# Patient Record
Sex: MALE | Race: WHITE | NOT HISPANIC OR LATINO | URBAN - METROPOLITAN AREA
[De-identification: names, ages, dates, MRNs, and addresses within clinical notes are randomized per-mention and may not be internally consistent; named-entity substitution may affect disease eponyms.]

---

## 2017-03-02 ENCOUNTER — OUTPATIENT (OUTPATIENT)
Dept: OUTPATIENT SERVICES | Facility: HOSPITAL | Age: 82
LOS: 1 days | End: 2017-03-02
Payer: MEDICARE

## 2017-03-02 DIAGNOSIS — I82.409 ACUTE EMBOLISM AND THROMBOSIS OF UNSPECIFIED DEEP VEINS OF UNSPECIFIED LOWER EXTREMITY: ICD-10-CM

## 2017-03-02 PROCEDURE — 93971 EXTREMITY STUDY: CPT | Mod: 26

## 2017-03-02 PROCEDURE — 93971 EXTREMITY STUDY: CPT

## 2018-02-10 ENCOUNTER — INPATIENT (INPATIENT)
Facility: HOSPITAL | Age: 83
LOS: 1 days | Discharge: NURSING, REHAB OR LONG-TERM CARE | DRG: 312 | End: 2018-02-12
Attending: INTERNAL MEDICINE | Admitting: INTERNAL MEDICINE
Payer: MEDICARE

## 2018-02-10 VITALS
SYSTOLIC BLOOD PRESSURE: 156 MMHG | TEMPERATURE: 98 F | HEIGHT: 69 IN | OXYGEN SATURATION: 96 % | WEIGHT: 110.01 LBS | RESPIRATION RATE: 18 BRPM | HEART RATE: 80 BPM | DIASTOLIC BLOOD PRESSURE: 54 MMHG

## 2018-02-10 DIAGNOSIS — R55 SYNCOPE AND COLLAPSE: ICD-10-CM

## 2018-02-10 DIAGNOSIS — Z95.1 PRESENCE OF AORTOCORONARY BYPASS GRAFT: Chronic | ICD-10-CM

## 2018-02-10 DIAGNOSIS — I10 ESSENTIAL (PRIMARY) HYPERTENSION: ICD-10-CM

## 2018-02-10 DIAGNOSIS — R41.82 ALTERED MENTAL STATUS, UNSPECIFIED: ICD-10-CM

## 2018-02-10 DIAGNOSIS — Z29.9 ENCOUNTER FOR PROPHYLACTIC MEASURES, UNSPECIFIED: ICD-10-CM

## 2018-02-10 DIAGNOSIS — I25.10 ATHEROSCLEROTIC HEART DISEASE OF NATIVE CORONARY ARTERY WITHOUT ANGINA PECTORIS: ICD-10-CM

## 2018-02-10 DIAGNOSIS — R29.6 REPEATED FALLS: ICD-10-CM

## 2018-02-10 DIAGNOSIS — E03.9 HYPOTHYROIDISM, UNSPECIFIED: ICD-10-CM

## 2018-02-10 DIAGNOSIS — F03.91 UNSPECIFIED DEMENTIA WITH BEHAVIORAL DISTURBANCE: ICD-10-CM

## 2018-02-10 LAB
ALBUMIN SERPL ELPH-MCNC: 3.6 G/DL — SIGNIFICANT CHANGE UP (ref 3.3–5)
ALP SERPL-CCNC: 225 U/L — HIGH (ref 30–120)
ALT FLD-CCNC: 15 U/L DA — SIGNIFICANT CHANGE UP (ref 10–60)
ANION GAP SERPL CALC-SCNC: 11 MMOL/L — SIGNIFICANT CHANGE UP (ref 5–17)
APPEARANCE UR: CLEAR — SIGNIFICANT CHANGE UP
APTT BLD: 30.5 SEC — SIGNIFICANT CHANGE UP (ref 27.5–37.4)
AST SERPL-CCNC: 20 U/L — SIGNIFICANT CHANGE UP (ref 10–40)
BASOPHILS # BLD AUTO: 0.1 K/UL — SIGNIFICANT CHANGE UP (ref 0–0.2)
BASOPHILS NFR BLD AUTO: 0.9 % — SIGNIFICANT CHANGE UP (ref 0–2)
BILIRUB SERPL-MCNC: 0.4 MG/DL — SIGNIFICANT CHANGE UP (ref 0.2–1.2)
BILIRUB UR-MCNC: NEGATIVE — SIGNIFICANT CHANGE UP
BUN SERPL-MCNC: 29 MG/DL — HIGH (ref 7–23)
CALCIUM SERPL-MCNC: 8.4 MG/DL — SIGNIFICANT CHANGE UP (ref 8.4–10.5)
CHLORIDE SERPL-SCNC: 105 MMOL/L — SIGNIFICANT CHANGE UP (ref 96–108)
CK MB BLD-MCNC: 1.2 % — SIGNIFICANT CHANGE UP (ref 0–3.5)
CK MB CFR SERPL CALC: 1.4 NG/ML — SIGNIFICANT CHANGE UP (ref 0–3.6)
CK SERPL-CCNC: 118 U/L — SIGNIFICANT CHANGE UP (ref 39–308)
CO2 SERPL-SCNC: 25 MMOL/L — SIGNIFICANT CHANGE UP (ref 22–31)
COLOR SPEC: YELLOW — SIGNIFICANT CHANGE UP
CREAT SERPL-MCNC: 1.73 MG/DL — HIGH (ref 0.5–1.3)
DIFF PNL FLD: NEGATIVE — SIGNIFICANT CHANGE UP
EOSINOPHIL # BLD AUTO: 0.2 K/UL — SIGNIFICANT CHANGE UP (ref 0–0.5)
EOSINOPHIL NFR BLD AUTO: 2 % — SIGNIFICANT CHANGE UP (ref 0–6)
GLUCOSE BLDC GLUCOMTR-MCNC: 120 MG/DL — HIGH (ref 70–99)
GLUCOSE SERPL-MCNC: 146 MG/DL — HIGH (ref 70–99)
GLUCOSE UR QL: NEGATIVE MG/DL — SIGNIFICANT CHANGE UP
HCT VFR BLD CALC: 35.9 % — LOW (ref 39–50)
HGB BLD-MCNC: 12.5 G/DL — LOW (ref 13–17)
INR BLD: 1 RATIO — SIGNIFICANT CHANGE UP (ref 0.88–1.16)
KETONES UR-MCNC: NEGATIVE — SIGNIFICANT CHANGE UP
LEUKOCYTE ESTERASE UR-ACNC: NEGATIVE — SIGNIFICANT CHANGE UP
LYMPHOCYTES # BLD AUTO: 1 K/UL — SIGNIFICANT CHANGE UP (ref 1–3.3)
LYMPHOCYTES # BLD AUTO: 12.4 % — LOW (ref 13–44)
MCHC RBC-ENTMCNC: 34.1 PG — HIGH (ref 27–34)
MCHC RBC-ENTMCNC: 34.7 GM/DL — SIGNIFICANT CHANGE UP (ref 32–36)
MCV RBC AUTO: 98.1 FL — SIGNIFICANT CHANGE UP (ref 80–100)
MONOCYTES # BLD AUTO: 0.7 K/UL — SIGNIFICANT CHANGE UP (ref 0–0.9)
MONOCYTES NFR BLD AUTO: 8.4 % — SIGNIFICANT CHANGE UP (ref 2–14)
NEUTROPHILS # BLD AUTO: 5.9 K/UL — SIGNIFICANT CHANGE UP (ref 1.8–7.4)
NEUTROPHILS NFR BLD AUTO: 76.3 % — SIGNIFICANT CHANGE UP (ref 43–77)
NITRITE UR-MCNC: NEGATIVE — SIGNIFICANT CHANGE UP
PH UR: 7 — SIGNIFICANT CHANGE UP (ref 5–8)
PLATELET # BLD AUTO: 214 K/UL — SIGNIFICANT CHANGE UP (ref 150–400)
POTASSIUM SERPL-MCNC: 4.3 MMOL/L — SIGNIFICANT CHANGE UP (ref 3.5–5.3)
POTASSIUM SERPL-SCNC: 4.3 MMOL/L — SIGNIFICANT CHANGE UP (ref 3.5–5.3)
PROT SERPL-MCNC: 7.3 G/DL — SIGNIFICANT CHANGE UP (ref 6–8.3)
PROT UR-MCNC: 30 MG/DL
PROTHROM AB SERPL-ACNC: 10.9 SEC — SIGNIFICANT CHANGE UP (ref 9.8–12.7)
RBC # BLD: 3.66 M/UL — LOW (ref 4.2–5.8)
RBC # FLD: 12.6 % — SIGNIFICANT CHANGE UP (ref 10.3–14.5)
SODIUM SERPL-SCNC: 141 MMOL/L — SIGNIFICANT CHANGE UP (ref 135–145)
SP GR SPEC: 1 — LOW (ref 1.01–1.02)
TROPONIN I SERPL-MCNC: 0.01 NG/ML — LOW (ref 0.02–0.06)
UROBILINOGEN FLD QL: NEGATIVE MG/DL — SIGNIFICANT CHANGE UP
WBC # BLD: 7.7 K/UL — SIGNIFICANT CHANGE UP (ref 3.8–10.5)
WBC # FLD AUTO: 7.7 K/UL — SIGNIFICANT CHANGE UP (ref 3.8–10.5)

## 2018-02-10 PROCEDURE — 71045 X-RAY EXAM CHEST 1 VIEW: CPT | Mod: 26

## 2018-02-10 PROCEDURE — 93010 ELECTROCARDIOGRAM REPORT: CPT

## 2018-02-10 PROCEDURE — 99285 EMERGENCY DEPT VISIT HI MDM: CPT

## 2018-02-10 PROCEDURE — 70450 CT HEAD/BRAIN W/O DYE: CPT | Mod: 26

## 2018-02-10 PROCEDURE — 72125 CT NECK SPINE W/O DYE: CPT | Mod: 26

## 2018-02-10 RX ORDER — ROPINIROLE 8 MG/1
0.5 TABLET, FILM COATED, EXTENDED RELEASE ORAL AT BEDTIME
Qty: 0 | Refills: 0 | Status: DISCONTINUED | OUTPATIENT
Start: 2018-02-10 | End: 2018-02-12

## 2018-02-10 RX ORDER — HYDRALAZINE HCL 50 MG
50 TABLET ORAL THREE TIMES A DAY
Qty: 0 | Refills: 0 | Status: DISCONTINUED | OUTPATIENT
Start: 2018-02-10 | End: 2018-02-12

## 2018-02-10 RX ORDER — HEPARIN SODIUM 5000 [USP'U]/ML
5000 INJECTION INTRAVENOUS; SUBCUTANEOUS EVERY 12 HOURS
Qty: 0 | Refills: 0 | Status: DISCONTINUED | OUTPATIENT
Start: 2018-02-10 | End: 2018-02-12

## 2018-02-10 RX ORDER — SODIUM CHLORIDE 9 MG/ML
3 INJECTION INTRAMUSCULAR; INTRAVENOUS; SUBCUTANEOUS ONCE
Qty: 0 | Refills: 0 | Status: COMPLETED | OUTPATIENT
Start: 2018-02-10 | End: 2018-02-10

## 2018-02-10 RX ORDER — DOCUSATE SODIUM 100 MG
100 CAPSULE ORAL DAILY
Qty: 0 | Refills: 0 | Status: DISCONTINUED | OUTPATIENT
Start: 2018-02-10 | End: 2018-02-12

## 2018-02-10 RX ORDER — SODIUM CHLORIDE 9 MG/ML
1000 INJECTION INTRAMUSCULAR; INTRAVENOUS; SUBCUTANEOUS
Qty: 0 | Refills: 0 | Status: DISCONTINUED | OUTPATIENT
Start: 2018-02-10 | End: 2018-02-12

## 2018-02-10 RX ORDER — PANTOPRAZOLE SODIUM 20 MG/1
40 TABLET, DELAYED RELEASE ORAL
Qty: 0 | Refills: 0 | Status: DISCONTINUED | OUTPATIENT
Start: 2018-02-10 | End: 2018-02-12

## 2018-02-10 RX ORDER — SODIUM CHLORIDE 9 MG/ML
1000 INJECTION INTRAMUSCULAR; INTRAVENOUS; SUBCUTANEOUS ONCE
Qty: 0 | Refills: 0 | Status: COMPLETED | OUTPATIENT
Start: 2018-02-10 | End: 2018-02-10

## 2018-02-10 RX ORDER — PREGABALIN 225 MG/1
500 CAPSULE ORAL DAILY
Qty: 0 | Refills: 0 | Status: DISCONTINUED | OUTPATIENT
Start: 2018-02-10 | End: 2018-02-12

## 2018-02-10 RX ORDER — ACETAMINOPHEN 500 MG
650 TABLET ORAL EVERY 6 HOURS
Qty: 0 | Refills: 0 | Status: DISCONTINUED | OUTPATIENT
Start: 2018-02-10 | End: 2018-02-12

## 2018-02-10 RX ORDER — LEVOTHYROXINE SODIUM 125 MCG
25 TABLET ORAL DAILY
Qty: 0 | Refills: 0 | Status: DISCONTINUED | OUTPATIENT
Start: 2018-02-10 | End: 2018-02-12

## 2018-02-10 RX ORDER — SIMVASTATIN 20 MG/1
40 TABLET, FILM COATED ORAL AT BEDTIME
Qty: 0 | Refills: 0 | Status: DISCONTINUED | OUTPATIENT
Start: 2018-02-10 | End: 2018-02-12

## 2018-02-10 RX ORDER — ASPIRIN/CALCIUM CARB/MAGNESIUM 324 MG
81 TABLET ORAL DAILY
Qty: 0 | Refills: 0 | Status: DISCONTINUED | OUTPATIENT
Start: 2018-02-10 | End: 2018-02-12

## 2018-02-10 RX ADMIN — SODIUM CHLORIDE 1000 MILLILITER(S): 9 INJECTION INTRAMUSCULAR; INTRAVENOUS; SUBCUTANEOUS at 20:52

## 2018-02-10 RX ADMIN — SODIUM CHLORIDE 3 MILLILITER(S): 9 INJECTION INTRAMUSCULAR; INTRAVENOUS; SUBCUTANEOUS at 19:43

## 2018-02-10 NOTE — H&P ADULT - NEUROLOGICAL DETAILS
sensation intact/deep reflexes intact/responds to pain/responds to verbal commands/alert and oriented x 3/cranial nerves intact

## 2018-02-10 NOTE — ED PROVIDER NOTE - OBJECTIVE STATEMENT
89 y.o. M BIBEMS for AMS - pt reportedly found unresponsive, possibly on floor, no further information available at this time, pt opening eyes spontaneously, not speaking, not following commands. Is DNR/DNI per [facility]. Do not currently have access to further information, awaiting contact with assisted living to fax face sheet, next of kin information and more information on current situation.

## 2018-02-10 NOTE — ED ADULT NURSE REASSESSMENT NOTE - NS ED NURSE REASSESS COMMENT FT1
Pt alert, answering all questions. Aware of who he is and where he is. States does not remember what happened to him. Interacting pleasantly w/ staff and granddtr. Voided 300cc clear, yellow urine. Used urinal himself w/out assistance. U/A, Urine c/s sent to lab.

## 2018-02-10 NOTE — ED PROVIDER NOTE - MEDICAL DECISION MAKING DETAILS
89 y.o. M DENISE from nursing home for AMS, pt's eyes open, otherwise non-responsive, iv, labs, imaging, reassess

## 2018-02-10 NOTE — ED ADULT NURSE REASSESSMENT NOTE - NS ED NURSE REASSESS COMMENT FT1
Rec'd pt unresponsive to verbal, tactile and painful stimuli. CM NSR 70's, no ectopi. V/SS, afebrile. Pupils unequal but responding to light. Neck brace applied. Spoke to Michell and [facility]. States fell and hit head. Does not know if witnessed. Called EMS immediately for ER transport. Pt's granddtr at ER bedside. 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 Pt currently in CT for STAT CT brain, c-spine. Rec'd pt unresponsive to verbal, tactile and painful stimuli. CM NSR 70's, no ectopi. V/SS, afebrile. Pupils unequal but responding to light. Neck brace applied. Spoke to Michell and Jadyn Court. States fell and hit head. Does not know if witnessed. Called EMS immediately for ER transport. Pt's granddtr at ER bedside. 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 Pt currently in CT for STAT CT brain, c-spine.    **ADDENDUM: As per Michell from "[employer] Court, pt is normal ambulatory and alert and verbal. After fall, change in mental status.

## 2018-02-10 NOTE — H&P ADULT - HISTORY OF PRESENT ILLNESS
89 yr old with pmh of CAD,cva,mitral regurg,renal insufficiency,prostate ca, hypothyroidis,dementia,peptic ulcer disease, GI bleed, insomnia, panic atatcks,cabg 10 yrs back.  no other complaint,hip surg,  hospitalized for cardiac problems. multiple times in past  allergic to ACEI, seafood,   denies any headache, no visual symptoms , no fever no bowel or urinary complaint  being admitted for possible syncope collapse, r/o neurocardiogenic causes, with FRANCO ckd, with hypothyroidism , and dementia

## 2018-02-10 NOTE — H&P ADULT - PMH
Coronary artery disease involving native coronary artery of native heart without angina pectoris    Dementia    High cholesterol    HTN (hypertension)    Hypothyroidism

## 2018-02-10 NOTE — H&P ADULT - ATTENDING COMMENTS
90minutes spent on this visit, 50% visit time spent in care co-ordination with other attendings and counselling patient  I have discussed care plan with patient and HCP,expressed understanding of problems treatment and their effect and side effects, alternatives in detail,I have asked if they have any questions and concerns and appropriately addressed them to best of my ability  Reviewed all diagonostic tests, lab results and drug drug interactions, and medications  d/w grand daughter at [phone] and on conference call with daughter rebeca,   pt is DNR dni

## 2018-02-10 NOTE — ED PROVIDER NOTE - CARE PLAN
Principal Discharge DX:	Altered mental status, unspecified altered mental status type Principal Discharge DX:	Syncope and collapse  Secondary Diagnosis:	Altered mental status, unspecified altered mental status type

## 2018-02-10 NOTE — H&P ADULT - ASSESSMENT
89 yr old with pmh of CAD,cva,mitral regurg,renal insufficiency,prostate ca, hypothyroidis,dementia,peptic ulcer disease, GI bleed, insomnia, panic atatcks,cabg 10 yrs back.  no other complaint,hip surg,  hospitalized for cardiac problems. multiple times in past  allergic to ACEI, seafood,   denies any headache, no visual symptoms , no fever no bowel or urinary complaint  being admitted for possible syncope collapse, r/o neurocardiogenic causes, with FRANCO ckd, with hypothyroidism , and dementia,CAD

## 2018-02-10 NOTE — ED ADULT NURSE REASSESSMENT NOTE - NS ED NURSE REASSESS COMMENT FT1
Left pupil 2mm and reactive to light.  Right pupil 3mm and reactive to light.  Pt's eyes open but remains unresponsive to verbal and painful stimuli. No movement to extremities. C-collar remains in place.

## 2018-02-11 LAB
ANION GAP SERPL CALC-SCNC: 8 MMOL/L — SIGNIFICANT CHANGE UP (ref 5–17)
BUN SERPL-MCNC: 24 MG/DL — HIGH (ref 7–23)
CALCIUM SERPL-MCNC: 8.8 MG/DL — SIGNIFICANT CHANGE UP (ref 8.4–10.5)
CHLORIDE SERPL-SCNC: 109 MMOL/L — HIGH (ref 96–108)
CHOLEST SERPL-MCNC: 123 MG/DL — SIGNIFICANT CHANGE UP (ref 10–199)
CK MB CFR SERPL CALC: 1.1 NG/ML — SIGNIFICANT CHANGE UP (ref 0–3.6)
CK MB CFR SERPL CALC: 1.7 NG/ML — SIGNIFICANT CHANGE UP (ref 0–3.6)
CO2 SERPL-SCNC: 27 MMOL/L — SIGNIFICANT CHANGE UP (ref 22–31)
CREAT SERPL-MCNC: 1.53 MG/DL — HIGH (ref 0.5–1.3)
GLUCOSE SERPL-MCNC: 104 MG/DL — HIGH (ref 70–99)
HCT VFR BLD CALC: 34.4 % — LOW (ref 39–50)
HDLC SERPL-MCNC: 46 MG/DL — SIGNIFICANT CHANGE UP (ref 40–125)
HGB BLD-MCNC: 12.2 G/DL — LOW (ref 13–17)
LIPID PNL WITH DIRECT LDL SERPL: 49 MG/DL — SIGNIFICANT CHANGE UP
MCHC RBC-ENTMCNC: 34.5 PG — HIGH (ref 27–34)
MCHC RBC-ENTMCNC: 35.3 GM/DL — SIGNIFICANT CHANGE UP (ref 32–36)
MCV RBC AUTO: 97.8 FL — SIGNIFICANT CHANGE UP (ref 80–100)
PLATELET # BLD AUTO: 194 K/UL — SIGNIFICANT CHANGE UP (ref 150–400)
POTASSIUM SERPL-MCNC: 4.4 MMOL/L — SIGNIFICANT CHANGE UP (ref 3.5–5.3)
POTASSIUM SERPL-SCNC: 4.4 MMOL/L — SIGNIFICANT CHANGE UP (ref 3.5–5.3)
RBC # BLD: 3.52 M/UL — LOW (ref 4.2–5.8)
RBC # FLD: 13 % — SIGNIFICANT CHANGE UP (ref 10.3–14.5)
SODIUM SERPL-SCNC: 144 MMOL/L — SIGNIFICANT CHANGE UP (ref 135–145)
T3 SERPL-MCNC: 73 NG/DL — LOW (ref 80–200)
T4 AB SER-ACNC: 6.2 UG/DL — SIGNIFICANT CHANGE UP (ref 4.6–12)
TOTAL CHOLESTEROL/HDL RATIO MEASUREMENT: 2.7 RATIO — LOW (ref 3.4–9.6)
TRIGL SERPL-MCNC: 140 MG/DL — SIGNIFICANT CHANGE UP (ref 10–149)
TROPONIN I SERPL-MCNC: 0.02 NG/ML — SIGNIFICANT CHANGE UP (ref 0.02–0.06)
TROPONIN I SERPL-MCNC: 0.02 NG/ML — SIGNIFICANT CHANGE UP (ref 0.02–0.06)
TSH SERPL-MCNC: 2.28 UIU/ML — SIGNIFICANT CHANGE UP (ref 0.27–4.2)
WBC # BLD: 7.8 K/UL — SIGNIFICANT CHANGE UP (ref 3.8–10.5)
WBC # FLD AUTO: 7.8 K/UL — SIGNIFICANT CHANGE UP (ref 3.8–10.5)

## 2018-02-11 PROCEDURE — 93880 EXTRACRANIAL BILAT STUDY: CPT | Mod: 26

## 2018-02-11 PROCEDURE — 93306 TTE W/DOPPLER COMPLETE: CPT | Mod: 26

## 2018-02-11 RX ADMIN — PANTOPRAZOLE SODIUM 40 MILLIGRAM(S): 20 TABLET, DELAYED RELEASE ORAL at 05:12

## 2018-02-11 RX ADMIN — HEPARIN SODIUM 5000 UNIT(S): 5000 INJECTION INTRAVENOUS; SUBCUTANEOUS at 05:07

## 2018-02-11 RX ADMIN — Medication 81 MILLIGRAM(S): at 12:13

## 2018-02-11 RX ADMIN — PREGABALIN 500 MICROGRAM(S): 225 CAPSULE ORAL at 12:12

## 2018-02-11 RX ADMIN — HEPARIN SODIUM 5000 UNIT(S): 5000 INJECTION INTRAVENOUS; SUBCUTANEOUS at 18:14

## 2018-02-11 RX ADMIN — Medication 100 MILLIGRAM(S): at 12:12

## 2018-02-11 RX ADMIN — Medication 25 MICROGRAM(S): at 05:09

## 2018-02-11 RX ADMIN — Medication 50 MILLIGRAM(S): at 14:08

## 2018-02-11 RX ADMIN — Medication 50 MILLIGRAM(S): at 05:06

## 2018-02-11 NOTE — DISCHARGE NOTE ADULT - MEDICATION SUMMARY - MEDICATIONS TO TAKE
I will START or STAY ON the medications listed below when I get home from the hospital:    Tylenol  -- 650  by mouth 2 times a day  -- Indication: For Pain    aspirin 325 mg oral tablet  -- 1 tab(s) by mouth once a day  -- Indication: For Ashd    simvastatin 40 mg oral tablet  -- 1 tab(s) by mouth once a day (at bedtime)  -- Indication: For Hld    Requip 0.5 mg oral tablet  -- orally once a day (at bedtime)  -- Indication: For Restless leg syndrome    chlorhexidine 0.12% mucous membrane liquid  -- mucous membrane 2 times a day  -- Indication: For Home med    Senna 8.6 mg oral tablet  -- 2 tab(s) by mouth once a day (at bedtime)  -- Indication: For Const    Colace 100 mg oral capsule  -- orally 3 times a day  -- Indication: For Const    Melatonin 3 mg oral tablet  -- 1 tab(s) by mouth once (at bedtime)  -- Indication: For insomnia    Protonix 40 mg oral delayed release tablet  -- 1 tab(s) by mouth once a day  -- Indication: For gerd    Synthroid 25 mcg (0.025 mg) oral tablet  -- 1 tab(s) by mouth once a day  -- Indication: For Hypothyroidism    hydrALAZINE 50 mg oral tablet  -- orally 3 times a day  -- Indication: For HTN (hypertension)    Vitamin B-12 500 mcg oral tablet  -- 1 tab(s) by mouth once a day  -- Indication: For Suppl

## 2018-02-11 NOTE — PROGRESS NOTE ADULT - ASSESSMENT
89 yr old with pmh of CAD,cva,mitral regurg,renal insufficiency,prostate ca, hypothyroidis,dementia,peptic ulcer disease, GI bleed, insomnia, panic atatcks,cabg 10 yrs back.  no other complaint,hip surg,  hospitalized for cardiac problems. 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   will have PT Gabrielaal

## 2018-02-11 NOTE — DISCHARGE NOTE ADULT - MEDICATION SUMMARY - MEDICATIONS TO STOP TAKING
I will STOP taking the medications listed below when I get home from the hospital:    lactulose 10 g/15 mL oral syrup  -- 30 milliliter(s) by mouth every other day (at bedtime)    MiraLax oral powder for reconstitution  -- orally once a day (at bedtime)

## 2018-02-11 NOTE — DISCHARGE NOTE ADULT - HOSPITAL COURSE
89 yr old with pmh of CAD,cva,mitral regurg,renal insufficiency,prostate ca, hypothyroidis,dementia,peptic ulcer disease, GI bleed, insomnia, panic atatcks,cabg 10 yrs back.  no other complaint,hip surg,  hospitalized for cardiac problems. 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

## 2018-02-11 NOTE — DISCHARGE NOTE ADULT - SECONDARY DIAGNOSIS.
Dementia with behavioral disturbance, unspecified dementia type Coronary artery disease involving native coronary artery of native heart without angina pectoris Hypothyroidism, unspecified type Essential hypertension Recurrent falls

## 2018-02-11 NOTE — DISCHARGE NOTE ADULT - CARE PLAN
Principal Discharge DX:	Syncope and collapse  Goal:	improved  Assessment and plan of treatment:	hydration and fall precautions  Secondary Diagnosis:	Dementia with behavioral disturbance, unspecified dementia type  Assessment and plan of treatment:	supportive care  Secondary Diagnosis:	Coronary artery disease involving native coronary artery of native heart without angina pectoris  Assessment and plan of treatment:	cont aspirin and cardiac meds and statin  Secondary Diagnosis:	Hypothyroidism, unspecified type  Assessment and plan of treatment:	levothyroxin  Secondary Diagnosis:	Essential hypertension  Goal:	stable  Assessment and plan of treatment:	hydralazine  Secondary Diagnosis:	Recurrent falls

## 2018-02-11 NOTE — PHYSICAL THERAPY INITIAL EVALUATION ADULT - ADDITIONAL COMMENTS
Pt states he was indep with all ADL's and Amb with RW. He states when he "forgets" to use RW is when he falls. Pt has RW.

## 2018-02-11 NOTE — DISCHARGE NOTE ADULT - CARE PROVIDER_API CALL
Eloise Enciso (MD), Internal Medicine; Nephrology  [address]  [address] [place] [address]  Phone: [phone]  Fax: [phone]

## 2018-02-11 NOTE — DISCHARGE NOTE ADULT - PATIENT PORTAL LINK FT
You can access the [facility] Patient Portal, offered by [facility], by registering with the following website: [email or web address]

## 2018-02-11 NOTE — PATIENT PROFILE ADULT. - FALL HARM RISK
other/coagulation(Bleeding disorder R/T clinical cond/anti-coags)/fall/bones(Osteoporosis,prev fx,steroid use,metastatic bone ca

## 2018-02-11 NOTE — CONSULT NOTE ADULT - SUBJECTIVE AND OBJECTIVE BOX
CARDIOLOGY CONSULT NOTE    Patient is a 89y Male with a known history of :  Prophylactic measure (Z29.9)  Dementia with behavioral disturbance, unspecified dementia type (F03.91)  Recurrent falls (R29.6)  Hypothyroidism, unspecified type (E03.9)  Essential hypertension (I10)  Coronary artery disease involving native coronary artery of native heart without angina pectoris (I25.10)  Syncope and collapse (R55)    HPI:  89 yr old with pmh of CAD,cva,mitral regurg,renal insufficiency,prostate ca, hypothyroidis,dementia,peptic ulcer disease, GI bleed, insomnia, panic atatcks,cabg 10 yrs back.  no other complaint,hip surg,  hospitalized for cardiac problems. multiple times in past  allergic to ACEI, seafood,   denies any headache, no visual symptoms , no fever no bowel or urinary complaint  being admitted for possible syncope collapse, r/o neurocardiogenic causes, with FRANCO ckd, with hypothyroidism , and dementia (10 Feb 2018 21:41)      REVIEW OF SYSTEMS:    CONSTITUTIONAL: No fever, weight loss, or fatigue  EYES: No eye pain, visual disturbances, or discharge  ENMT:  No difficulty hearing, tinnitus, vertigo; No sinus or throat pain  NECK: No pain or stiffness  BREASTS: No pain, masses, or nipple discharge  RESPIRATORY: No cough, wheezing, chills or hemoptysis; No shortness of breath  CARDIOVASCULAR: No chest pain, palpitations, dizziness, or leg swelling  GASTROINTESTINAL: No abdominal or epigastric pain. No nausea, vomiting, or hematemesis; No diarrhea or constipation. No melena or hematochezia.  GENITOURINARY: No dysuria, frequency, hematuria, or incontinence  NEUROLOGICAL: No headaches, memory loss, loss of strength, numbness, or tremors  SKIN: No itching, burning, rashes, or lesions   LYMPH NODES: No enlarged glands  ENDOCRINE: No heat or cold intolerance; No hair loss  MUSCULOSKELETAL: No joint pain or swelling; No muscle, back, or extremity pain  PSYCHIATRIC: No depression, anxiety, mood swings, or difficulty sleeping  HEME/LYMPH: No easy bruising, or bleeding gums  ALLERGY AND IMMUNOLOGIC: No hives or eczema    MEDICATIONS  (STANDING):  aspirin  chewable 81 milliGRAM(s) Oral daily  cyanocobalamin 500 MICROGram(s) Oral daily  docusate sodium 100 milliGRAM(s) Oral daily  heparin  Injectable 5000 Unit(s) SubCutaneous every 12 hours  hydrALAZINE 50 milliGRAM(s) Oral three times a day  levothyroxine 25 MICROGram(s) Oral daily  pantoprazole    Tablet 40 milliGRAM(s) Oral before breakfast  rOPINIRole 0.5 milliGRAM(s) Oral at bedtime  simvastatin 40 milliGRAM(s) Oral at bedtime  sodium chloride 0.9%. 1000 milliLiter(s) (50 mL/Hr) IV Continuous <Continuous>    MEDICATIONS  (PRN):  acetaminophen   Tablet. 650 milliGRAM(s) Oral every 6 hours PRN Moderate Pain (4 - 6)      ALLERGIES: Drug Allergies Not Recorded  fish (Other)      FAMILY HISTORY:      Social History:  Alochol:   Smoking:   Drug Use:   Marital Status:     I&O's Detail    10 Feb 2018 07:01  -  11 Feb 2018 07:00  --------------------------------------------------------  IN:    Sodium Chloride 0.9% IV Bolus: 1000 mL  Total IN: 1000 mL    OUT:    Voided: 500 mL  Total OUT: 500 mL    Total NET: 500 mL          PHYSICAL EXAMINATION:  -----------------------------  T(C): 36.8 (02-11-18 @ 08:49), Max: 36.8 (02-11-18 @ 08:49)  HR: 68 (02-11-18 @ 08:49) (68 - 84)  BP: 162/51 (02-11-18 @ 08:49) (152/64 - 180/67)  RR: 18 (02-11-18 @ 08:49) (15 - 20)  SpO2: 96% (02-11-18 @ 05:13) (95% - 97%)  Wt(kg): --    02-10 @ 07:01  -  02-11 @ 07:00  --------------------------------------------------------  IN:    Sodium Chloride 0.9% IV Bolus: 1000 mL  Total IN: 1000 mL    OUT:    Voided: 500 mL  Total OUT: 500 mL    Total NET: 500 mL        Height (cm): 175.26 (02-10 @ 18:50)  Weight (kg): 49.9 (02-10 @ 18:50)  BMI (kg/m2): 16.2 (02-10 @ 18:50)  BSA (m2): 1.6 (02-10 @ 18:50)    Constitutional: well developed, normal appearance, well groomed, well nourished, no deformities and no acute distress.   Eyes: the conjunctiva exhibited no abnormalities and the eyelids demonstrated no xanthelasmas.   HEENT: normal oral mucosa, no oral pallor and no oral cyanosis.   Neck: right bruit.  Pulmonary: no respiratory distress, normal respiratory rhythm and effort, no accessory muscle use and lungs were clear to auscultation bilaterally. Anteriorly  Cardiovascular: heart rate and rhythm were normal, decreased S1 and S2 with II-III/VI systolic murmur.  Musculoskeletal: the gait could not be assessed.   Extremities: no clubbing of the fingernails, no localized cyanosis, no petechial hemorrhages and no ischemic changes.   Skin: normal skin color and pigmentation, no rash, no venous stasis, no skin lesions, no skin ulcer and no xanthoma was observed.     LABS:   --------  02-11    144  |  109<H>  |  24<H>  ----------------------------<  104<H>  4.4   |  27  |  1.53<H>    Ca    8.8      11 Feb 2018 06:10    TPro  7.3  /  Alb  3.6  /  TBili  0.4  /  DBili  x   /  AST  20  /  ALT  15  /  AlkPhos  225<H>  02-10                         12.2   7.8   )-----------( 194      ( 11 Feb 2018 06:10 )             34.4     PT/INR - ( 10 Feb 2018 19:31 )   PT: 10.9 sec;   INR: 1.00 ratio         PTT - ( 10 Feb 2018 19:31 )  PTT:30.5 sec    02-11 @ 03:01 CPK total:--, CKMB --, Troponin I - .020 ng/mL [.017 - .056]  02-10 @ 19:31 CPK total:--, CKMB --, Troponin I - .015 ng/mL<L> [.017 - .056]            RADIOLOGY:  -----------------        ECG: NSR with first degree vs mey rhythm, IVCD
.

## 2018-02-11 NOTE — DIETITIAN INITIAL EVALUATION ADULT. - OTHER INFO
Pt admitted for Pt admitted for unresponsiveness s/p fall  Pmhx: CAD,CVA,  renal insufficiency,prostate ca, hypothyroid,dementia,peptic ulcer disease, GI bleed, insomnia, panic attacks ,CABG. Based on care home info pt was on regular diet. Currently pt alert and orientated:He denies weight or appetite change PTA. He reports his # (weight on admission 109#) No noted overt s/sx malnutrition. Admit weight likely error. Pt reports good appetite/tolerance to diet. Pt s skinbreakdown.

## 2018-02-11 NOTE — PROGRESS NOTE ADULT - SUBJECTIVE AND OBJECTIVE BOX
Patient is a 89y old  Male who presents with a chief complaint of unresponsiveness and fall (2018 00:54)      INTERVAL HPI/OVERNIGHT EVENTS:no acute event overnight    Home Medications:  aspirin 325 mg oral tablet: 1 tab(s) orally once a day (10 Feb 2018 22:07)  chlorhexidine 0.12% mucous membrane liquid: mucous membrane 2 times a day (10 Feb 2018 22:07)  Colace 100 mg oral capsule: orally 3 times a day (10 Feb 2018 22:07)  Dulcolax Stool Softener 100 mg oral capsule: orally 3 times a day (10 Feb 2018 22:07)  hydrALAZINE 50 mg oral tablet: orally 3 times a day (10 Feb 2018 22:07)  lactulose 10 g/15 mL oral syrup: 30 milliliter(s) orally every other day (at bedtime) (10 Feb 2018 22:07)  Melatonin 3 mg oral tablet: 1 tab(s) orally once (at bedtime) (10 Feb 2018 22:07)  MiraLax oral powder for reconstitution: orally once a day (at bedtime) (10 Feb 2018 22:07)  Protonix 40 mg oral delayed release tablet: 1 tab(s) orally once a day (10 Feb 2018 22:07)  Requip 0.5 mg oral tablet: orally once a day (at bedtime) (10 Feb 2018 22:07)  Senna 8.6 mg oral tablet: 2 tab(s) orally once a day (at bedtime) (10 Feb 2018 22:07)  simvastatin 40 mg oral tablet: 1 tab(s) orally once a day (at bedtime) (10 Feb 2018 22:07)  Synthroid 25 mcg (0.025 mg) oral tablet: 1 tab(s) orally once a day (10 Feb 2018 22:07)  Tylenol: 650  orally 2 times a day (10 Feb 2018 22:07)  Vitamin B-12 500 mcg oral tablet: 1 tab(s) orally once a day (10 Feb 2018 22:07)      MEDICATIONS  (STANDING):  aspirin  chewable 81 milliGRAM(s) Oral daily  cyanocobalamin 500 MICROGram(s) Oral daily  docusate sodium 100 milliGRAM(s) Oral daily  heparin  Injectable 5000 Unit(s) SubCutaneous every 12 hours  hydrALAZINE 50 milliGRAM(s) Oral three times a day  levothyroxine 25 MICROGram(s) Oral daily  pantoprazole    Tablet 40 milliGRAM(s) Oral before breakfast  rOPINIRole 0.5 milliGRAM(s) Oral at bedtime  simvastatin 40 milliGRAM(s) Oral at bedtime  sodium chloride 0.9%. 1000 milliLiter(s) (50 mL/Hr) IV Continuous <Continuous>    MEDICATIONS  (PRN):  acetaminophen   Tablet. 650 milliGRAM(s) Oral every 6 hours PRN Moderate Pain (4 - 6)      Allergies    Drug Allergies Not Recorded  fish (Other)    Intolerances        REVIEW OF SYSTEMS:  CONSTITUTIONAL: No fever, weight loss, has  fatigue- wants to sleep  EYES: No eye pain, visual disturbances, or discharge  ENMT:  No difficulty hearing, tinnitus, vertigo; No sinus or throat pain  NECK: No pain or stiffness  BREASTS: No pain, masses, or nipple discharge  RESPIRATORY: No cough, wheezing, chills or hemoptysis; No shortness of breath  CARDIOVASCULAR: No chest pain, palpitations, dizziness, or leg swelling  GASTROINTESTINAL: No abdominal or epigastric pain. No nausea, vomiting, or hematemesis; No diarrhea or constipation. No melena or hematochezia.  GENITOURINARY: No dysuria, frequency, hematuria, or incontinence  NEUROLOGICAL: No headaches,has poor  memory loss, loss of strength, numbness, or tremors  SKIN: No itching, burning, rashes, or lesions   LYMPH NODES: No enlarged glands  ENDOCRINE: No heat or cold intolerance; No hair loss  MUSCULOSKELETAL: No joint pain or swelling; No muscle, back, or extremity pain  PSYCHIATRIC: No depression, anxiety, mood swings, or difficulty sleeping  HEME/LYMPH: No easy bruising, or bleeding gums  ALLERGY AND IMMUNOLOGIC: No hives or eczema    Vital Signs Last 24 Hrs  T(C): 36.8 (2018 08:49), Max: 36.8 (2018 08:49)  T(F): 98.3 (2018 08:49), Max: 98.3 (2018 08:49)  HR: 68 (2018 08:49) (68 - 84)  BP: 162/51 (2018 08:49) (152/64 - 180/67)  BP(mean): --  RR: 18 (2018 08:49) (15 - 20)  SpO2: 96% (2018 05:13) (95% - 97%)    PHYSICAL EXAM:  GENERAL: NAD, well-groomed, well-developed  HEAD:  Atraumatic, Normocephalic  EYES: EOMI, PERRLA, conjunctiva and sclera clear  ENMT: Moist mucous membranes,   NECK: Supple, No JVD, Normal thyroid  NERVOUS SYSTEM:  Alert & Oriented X3, poor memory, Motor Strength 5/5 B/L upper and lower extremities; DTRs 2+ intact and symmetric, unstable gait  CHEST/LUNG: Clear to percussion bilaterally; No rales, rhonchi, wheezing, or rubs  HEART: Regular rate and rhythm; No murmurs, rubs, or gallops  ABDOMEN: Soft, Nontender, Nondistended; Bowel sounds present  EXTREMITIES:  2+ Peripheral Pulses, No clubbing, cyanosis, or edema  LYMPH: No lymphadenopathy noted  SKIN: No rashes or lesions    LABS:                        12.2   7.8   )-----------( 194      ( 2018 06:10 )             34.4     02-11    144  |  109<H>  |  24<H>  ----------------------------<  104<H>  4.4   |  27  |  1.53<H>    Ca    8.8      2018 06:10    TPro  7.3  /  Alb  3.6  /  TBili  0.4  /  DBili  x   /  AST  20  /  ALT  15  /  AlkPhos  225<H>  02-10    PT/INR - ( 10 Feb 2018 19:31 )   PT: 10.9 sec;   INR: 1.00 ratio         PTT - ( 10 Feb 2018 19:31 )  PTT:30.5 sec  Urinalysis Basic - ( 10 Feb 2018 21:02 )    Color: Yellow / Appearance: Clear / S.005 / pH: x  Gluc: x / Ketone: Negative  / Bili: Negative / Urobili: Negative mg/dL   Blood: x / Protein: 30 mg/dL / Nitrite: Negative   Leuk Esterase: Negative / RBC: Negative /HPF / WBC Negative   Sq Epi: x / Non Sq Epi: Negative / Bacteria: Negative      CAPILLARY BLOOD GLUCOSE      POCT Blood Glucose.: 120 mg/dL (10 Feb 2018 19:20)          RADIOLOGY & ADDITIONAL TESTS:    Imaging Personally Reviewed:  [x ] YES  [ ] NO    Consultant(s) Notes Reviewed:  [x ] YES  [ ] NO    Care Discussed with Consultants/Other Providers [x ] YES  [ ] NO

## 2018-02-11 NOTE — CONSULT NOTE ADULT - ASSESSMENT
89y/o w/m seen at [facility]. History from chart.  History dementia, CVA, CAD, mitral regurgitation, thyroid disease, PUD, panic attacks.  S/P CABG    Admitted for syncope with lightheadedness and dizzy prior.    Now only complaining of being tired.    Cardiac enzymes negative x2.    R/O arrhythmia.    Plan:  - R/O MI protocol.  - Repeat EKG.  - Echocardiogram report pending.  - Carotid sonogram.  - Continue Hydralazine, Simvastatin, ASA.  - Seen by Neurology.
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

## 2018-02-11 NOTE — DISCHARGE NOTE ADULT - PLAN OF CARE
improved hydration and fall precautions supportive care cont aspirin and cardiac meds and statin levothyroxin stable hydralazine

## 2018-02-11 NOTE — DISCHARGE NOTE ADULT - NS AS DC STROKE ED MATERIALS
Call 911 for Stroke/Risk Factors for Stroke/Need for Followup After Discharge/Prescribed Medications/Stroke Education Booklet/Stroke Warning Signs and Symptoms/Advancing age is a risk factor for strokes Advancing age is a risk factor for strokes

## 2018-02-12 VITALS
HEART RATE: 62 BPM | RESPIRATION RATE: 18 BRPM | DIASTOLIC BLOOD PRESSURE: 72 MMHG | SYSTOLIC BLOOD PRESSURE: 150 MMHG | OXYGEN SATURATION: 97 % | TEMPERATURE: 98 F

## 2018-02-12 LAB
ANION GAP SERPL CALC-SCNC: 7 MMOL/L — SIGNIFICANT CHANGE UP (ref 5–17)
BUN SERPL-MCNC: 25 MG/DL — HIGH (ref 7–23)
CALCIUM SERPL-MCNC: 8.6 MG/DL — SIGNIFICANT CHANGE UP (ref 8.4–10.5)
CHLORIDE SERPL-SCNC: 109 MMOL/L — HIGH (ref 96–108)
CO2 SERPL-SCNC: 25 MMOL/L — SIGNIFICANT CHANGE UP (ref 22–31)
CREAT SERPL-MCNC: 1.53 MG/DL — HIGH (ref 0.5–1.3)
GLUCOSE SERPL-MCNC: 105 MG/DL — HIGH (ref 70–99)
HCT VFR BLD CALC: 33 % — LOW (ref 39–50)
HGB BLD-MCNC: 11.7 G/DL — LOW (ref 13–17)
MCHC RBC-ENTMCNC: 34.5 PG — HIGH (ref 27–34)
MCHC RBC-ENTMCNC: 35.5 GM/DL — SIGNIFICANT CHANGE UP (ref 32–36)
MCV RBC AUTO: 97.3 FL — SIGNIFICANT CHANGE UP (ref 80–100)
PLATELET # BLD AUTO: 182 K/UL — SIGNIFICANT CHANGE UP (ref 150–400)
POTASSIUM SERPL-MCNC: 4 MMOL/L — SIGNIFICANT CHANGE UP (ref 3.5–5.3)
POTASSIUM SERPL-SCNC: 4 MMOL/L — SIGNIFICANT CHANGE UP (ref 3.5–5.3)
RBC # BLD: 3.39 M/UL — LOW (ref 4.2–5.8)
RBC # FLD: 12.5 % — SIGNIFICANT CHANGE UP (ref 10.3–14.5)
SODIUM SERPL-SCNC: 141 MMOL/L — SIGNIFICANT CHANGE UP (ref 135–145)
WBC # BLD: 6.4 K/UL — SIGNIFICANT CHANGE UP (ref 3.8–10.5)
WBC # FLD AUTO: 6.4 K/UL — SIGNIFICANT CHANGE UP (ref 3.8–10.5)

## 2018-02-12 PROCEDURE — 82550 ASSAY OF CK (CPK): CPT

## 2018-02-12 PROCEDURE — 80053 COMPREHEN METABOLIC PANEL: CPT

## 2018-02-12 PROCEDURE — 97161 PT EVAL LOW COMPLEX 20 MIN: CPT

## 2018-02-12 PROCEDURE — 84484 ASSAY OF TROPONIN QUANT: CPT

## 2018-02-12 PROCEDURE — 93306 TTE W/DOPPLER COMPLETE: CPT

## 2018-02-12 PROCEDURE — 71045 X-RAY EXAM CHEST 1 VIEW: CPT

## 2018-02-12 PROCEDURE — 36415 COLL VENOUS BLD VENIPUNCTURE: CPT

## 2018-02-12 PROCEDURE — 80048 BASIC METABOLIC PNL TOTAL CA: CPT

## 2018-02-12 PROCEDURE — 85027 COMPLETE CBC AUTOMATED: CPT

## 2018-02-12 PROCEDURE — 85730 THROMBOPLASTIN TIME PARTIAL: CPT

## 2018-02-12 PROCEDURE — 96360 HYDRATION IV INFUSION INIT: CPT

## 2018-02-12 PROCEDURE — 80061 LIPID PANEL: CPT

## 2018-02-12 PROCEDURE — 72125 CT NECK SPINE W/O DYE: CPT

## 2018-02-12 PROCEDURE — 70450 CT HEAD/BRAIN W/O DYE: CPT

## 2018-02-12 PROCEDURE — 84436 ASSAY OF TOTAL THYROXINE: CPT

## 2018-02-12 PROCEDURE — 84480 ASSAY TRIIODOTHYRONINE (T3): CPT

## 2018-02-12 PROCEDURE — 84443 ASSAY THYROID STIM HORMONE: CPT

## 2018-02-12 PROCEDURE — 99285 EMERGENCY DEPT VISIT HI MDM: CPT | Mod: 25

## 2018-02-12 PROCEDURE — 93880 EXTRACRANIAL BILAT STUDY: CPT

## 2018-02-12 PROCEDURE — 82962 GLUCOSE BLOOD TEST: CPT

## 2018-02-12 PROCEDURE — 93005 ELECTROCARDIOGRAM TRACING: CPT

## 2018-02-12 PROCEDURE — G0378: CPT

## 2018-02-12 PROCEDURE — 82553 CREATINE MB FRACTION: CPT

## 2018-02-12 PROCEDURE — 85610 PROTHROMBIN TIME: CPT

## 2018-02-12 PROCEDURE — 81001 URINALYSIS AUTO W/SCOPE: CPT

## 2018-02-12 RX ORDER — DOCUSATE SODIUM 100 MG
0 CAPSULE ORAL
Qty: 0 | Refills: 0 | COMMUNITY

## 2018-02-12 RX ORDER — LACTULOSE 10 G/15ML
30 SOLUTION ORAL
Qty: 0 | Refills: 0 | COMMUNITY

## 2018-02-12 RX ORDER — POLYETHYLENE GLYCOL 3350 17 G/17G
0 POWDER, FOR SOLUTION ORAL
Qty: 0 | Refills: 0 | COMMUNITY

## 2018-02-12 RX ADMIN — HEPARIN SODIUM 5000 UNIT(S): 5000 INJECTION INTRAVENOUS; SUBCUTANEOUS at 05:53

## 2018-02-12 RX ADMIN — ROPINIROLE 0.5 MILLIGRAM(S): 8 TABLET, FILM COATED, EXTENDED RELEASE ORAL at 00:23

## 2018-02-12 RX ADMIN — Medication 50 MILLIGRAM(S): at 05:54

## 2018-02-12 RX ADMIN — SIMVASTATIN 40 MILLIGRAM(S): 20 TABLET, FILM COATED ORAL at 00:22

## 2018-02-12 RX ADMIN — Medication 50 MILLIGRAM(S): at 13:20

## 2018-02-12 RX ADMIN — Medication 100 MILLIGRAM(S): at 12:16

## 2018-02-12 RX ADMIN — PANTOPRAZOLE SODIUM 40 MILLIGRAM(S): 20 TABLET, DELAYED RELEASE ORAL at 06:02

## 2018-02-12 RX ADMIN — Medication 25 MICROGRAM(S): at 05:54

## 2018-02-12 RX ADMIN — Medication 81 MILLIGRAM(S): at 12:16

## 2018-02-12 RX ADMIN — Medication 50 MILLIGRAM(S): at 00:22

## 2018-02-12 RX ADMIN — PREGABALIN 500 MICROGRAM(S): 225 CAPSULE ORAL at 12:16

## 2018-02-12 NOTE — PROGRESS NOTE ADULT - SUBJECTIVE AND OBJECTIVE BOX
Neurology follow up note    SATYA PEREZ89yMale      Interval History:    Patient feels ok no new complaints.    MEDICATIONS    acetaminophen   Tablet. 650 milliGRAM(s) Oral every 6 hours PRN  aspirin  chewable 81 milliGRAM(s) Oral daily  cyanocobalamin 500 MICROGram(s) Oral daily  docusate sodium 100 milliGRAM(s) Oral daily  heparin  Injectable 5000 Unit(s) SubCutaneous every 12 hours  hydrALAZINE 50 milliGRAM(s) Oral three times a day  levothyroxine 25 MICROGram(s) Oral daily  pantoprazole    Tablet 40 milliGRAM(s) Oral before breakfast  rOPINIRole 0.5 milliGRAM(s) Oral at bedtime  simvastatin 40 milliGRAM(s) Oral at bedtime  sodium chloride 0.9%. 1000 milliLiter(s) IV Continuous <Continuous>      Allergies    Drug Allergies Not Recorded  fish (Other)    Intolerances            Vital Signs Last 24 Hrs  T(C): 36.9 (2018 05:39), Max: 37.5 (2018 16:01)  T(F): 98.4 (2018 05:39), Max: 99.5 (2018 16:01)  HR: 82 (2018 05:39) (68 - 82)  BP: 171/72 (2018 05:39) (115/44 - 171/72)  BP(mean): --  RR: 16 (2018 05:39) (16 - 20)  SpO2: 96% (2018 05:39) (93% - 97%)      REVIEW OF SYSTEMS:     Constitutional: No fever, chills, fatigue, weakness  Eyes: no eye pain, visual disturbances, or discharge  ENT:  No difficulty hearing, tinnitus, vertigo; No sinus or throat pain  Neck: No pain or stiffness  Respiratory: No cough, dyspnea, wheezing   Cardiovascular: No chest pain, palpitations,   Gastrointestinal: No abdominal or epigastric pain. No nausea, vomiting  No diarrhea or constipation.   Genitourinary: No dysuria, frequency, hematuria or incontinence  Neurological: No headaches, lightheadedness, vertigo, numbness or tremors  Psychiatric: No depression, anxiety, mood swings or difficulty sleeping  Musculoskeletal: No joint pain or swelling; No muscle, back or extremity pain  Skin: No itching, burning, rashes or lesions   Lymph Nodes: No enlarged glands  Endocrine: No heat or cold intolerance; No hair loss   Allergy and Immunologic: No hives or eczema    On Neurological Examination:    Mental Status - Patient is alert, awake, oriented X3.     Follow simple commands  Follow complex commands    Speech -   Fluent                    Cranial Nerves - Pupils 3 mm equal and reactive to light,   extraocular eye movements intact.   smile symmetric  intact bilateral NLF    Motor Exam -   Right upper 5/5  Left upper 5/5  Right lower 5/5  Left lower 5/5    Muscle tone - is normal all over.  No asymmetry is seen.      Sensory    Bilateral intact to light touch    GENERAL Exam: Nontoxic , No Acute Distress   	  HEENT:  normocephalic, atraumatic  		  LUNGS: Clear bilaterally    	  HEART: Normal S1S2   No murmur RRR        	  GI/ ABDOMEN:  Soft  Non tender    EXTREMITIES:   No Edema  No Clubbing  No Cyanosis No Edema    MUSCULOSKELETAL: Normal Range of Motion  	   SKIN: Normal  No Ecchymosis               LABS:  CBC Full  -  ( 2018 06:48 )  WBC Count : 6.4 K/uL  Hemoglobin : 11.7 g/dL  Hematocrit : 33.0 %  Platelet Count - Automated : 182 K/uL  Mean Cell Volume : 97.3 fl  Mean Cell Hemoglobin : 34.5 pg  Mean Cell Hemoglobin Concentration : 35.5 gm/dL  Auto Neutrophil # : x  Auto Lymphocyte # : x  Auto Monocyte # : x  Auto Eosinophil # : x  Auto Basophil # : x  Auto Neutrophil % : x  Auto Lymphocyte % : x  Auto Monocyte % : x  Auto Eosinophil % : x  Auto Basophil % : x    Urinalysis Basic - ( 10 Feb 2018 21:02 )    Color: Yellow / Appearance: Clear / S.005 / pH: x  Gluc: x / Ketone: Negative  / Bili: Negative / Urobili: Negative mg/dL   Blood: x / Protein: 30 mg/dL / Nitrite: Negative   Leuk Esterase: Negative / RBC: Negative /HPF / WBC Negative   Sq Epi: x / Non Sq Epi: Negative / Bacteria: Negative          141  |  109<H>  |  25<H>  ----------------------------<  105<H>  4.0   |  25  |  1.53<H>    Ca    8.6      2018 06:48    TPro  7.3  /  Alb  3.6  /  TBili  0.4  /  DBili  x   /  AST  20  /  ALT  15  /  AlkPhos  225<H>  02-10    Hemoglobin A1C:   Lipid Panel  @ 11:27  Total Cholesterol, Serum 123  LDL 49  Triglycerides 140    LIVER FUNCTIONS - ( 10 Feb 2018 19:31 )  Alb: 3.6 g/dL / Pro: 7.3 g/dL / ALK PHOS: 225 U/L / ALT: 15 U/L DA / AST: 20 U/L / GGT: x           Vitamin B12   PT/INR - ( 10 Feb 2018 19:31 )   PT: 10.9 sec;   INR: 1.00 ratio         PTT - ( 10 Feb 2018 19:31 )  PTT:30.5 sec      RADIOLOGY    ASSESSMENT AND PLAN     loc rec tele eval  rls cont requip  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

## 2018-02-12 NOTE — PROGRESS NOTE ADULT - ATTENDING COMMENTS
45minutes spent on this visit, 50% visit time spent in care co-ordination with other attendings and counselling patient  I have discussed care plan with patient and HCP,expressed understanding of problems treatment and their effect and side effects, alternatives in detail,I have asked if they have any questions and concerns and appropriately addressed them to best of my ability  Reviewed all diagonostic tests, lab results and drug drug interactions, and medications  d/w grand daughter at [phone] and on conference call with daughter rebeca,   pt is DNR dni
45minutes spent on this visit, 50% visit time spent in care co-ordination with other attendings and counselling patient  I have discussed care plan with patient and HCP,expressed understanding of problems treatment and their effect and side effects, alternatives in detail,I have asked if they have any questions and concerns and appropriately addressed them to best of my ability  Reviewed all diagonostic tests, lab results and drug drug interactions, and medications  d/w grand daughter at [phone] and on conference call with daughter rebeca,   pt is DNR dni

## 2018-02-12 NOTE — PROGRESS NOTE ADULT - ASSESSMENT
89 year old male, history of CABG, dementia, with possible syncope.  MI ruled out.  No cause found on Echo, Carotid Duplex, or CT head.  No objection to discharge.

## 2018-02-12 NOTE — PROGRESS NOTE ADULT - SUBJECTIVE AND OBJECTIVE BOX
Patient is a 89y Male with a known history of :  Prophylactic measure (Z29.9)  Dementia with behavioral disturbance, unspecified dementia type (F03.91)  Recurrent falls (R29.6)  Hypothyroidism, unspecified type (E03.9)  Essential hypertension (I10)  Coronary artery disease involving native coronary artery of native heart without angina pectoris (I25.10)  Syncope and collapse (R55)    HPI:  89 yr old with pmh of CAD,cva,mitral regurg,renal insufficiency,prostate ca, hypothyroidis,dementia,peptic ulcer disease, GI bleed, insomnia, panic atatcks,cabg 10 yrs back.  no other complaint,hip surg,  hospitalized for cardiac problems. multiple times in past  allergic to ACEI, seafood,   denies any headache, no visual symptoms , no fever no bowel or urinary complaint  being admitted for possible syncope collapse, r/o neurocardiogenic causes, with FRANCO ckd, with hypothyroidism , and dementia (10 Feb 2018 21:41)        MEDICATIONS  (STANDING):  aspirin  chewable 81 milliGRAM(s) Oral daily  cyanocobalamin 500 MICROGram(s) Oral daily  docusate sodium 100 milliGRAM(s) Oral daily  heparin  Injectable 5000 Unit(s) SubCutaneous every 12 hours  hydrALAZINE 50 milliGRAM(s) Oral three times a day  levothyroxine 25 MICROGram(s) Oral daily  pantoprazole    Tablet 40 milliGRAM(s) Oral before breakfast  rOPINIRole 0.5 milliGRAM(s) Oral at bedtime  simvastatin 40 milliGRAM(s) Oral at bedtime  sodium chloride 0.9%. 1000 milliLiter(s) (50 mL/Hr) IV Continuous <Continuous>    MEDICATIONS  (PRN):  acetaminophen   Tablet. 650 milliGRAM(s) Oral every 6 hours PRN Moderate Pain (4 - 6)      ALLERGIES: Drug Allergies Not Recorded  fish (Other)      FAMILY HISTORY:      Social history:  Alochol:   Smoking:   Drug Use:   Marital Status:     PHYSICAL EXAMINATION:  -----------------------------  T(C): 36.9 (02-12-18 @ 05:39), Max: 37.5 (02-11-18 @ 16:01)  HR: 82 (02-12-18 @ 05:39) (68 - 82)  BP: 171/72 (02-12-18 @ 05:39) (115/44 - 171/72)  RR: 16 (02-12-18 @ 05:39) (16 - 20)  SpO2: 96% (02-12-18 @ 05:39) (93% - 97%)  Wt(kg): --    02-11 @ 07:01  -  02-12 @ 07:00  --------------------------------------------------------  IN:    Oral Fluid: 120 mL    sodium chloride 0.9%.: 600 mL  Total IN: 720 mL    OUT:    Voided: 950 mL  Total OUT: 950 mL    Total NET: -230 mL            Constitutional: well developed, normal appearance, well groomed, well nourished, no deformities and no acute distress.   Eyes: the conjunctiva exhibited no abnormalities and the eyelids demonstrated no xanthelasmas.   HEENT: normal oral mucosa, no oral pallor and no oral cyanosis.   Neck: normal jugular venous A waves present, normal jugular venous V waves present and no jugular venous toussaint A waves.   Pulmonary: no respiratory distress, normal respiratory rhythm and effort, no accessory muscle use and lungs were clear to auscultation bilaterally.   Cardiovascular: heart rate and rhythm were normal, normal S1 and S2, III/VI systolic blow Aorta  Musculoskeletal: the gait could not be assessed..   Extremities: no clubbing of the fingernails, no localized cyanosis, no petechial hemorrhages and no ischemic changes.   Skin: normal skin color and pigmentation, no rash, no venous stasis, no skin lesions, no skin ulcer and no xanthoma was observed.   Psychiatric: oriented to person, place, and time, the affect was normal, the mood was normal and not feeling anxious.     LABS:   --------  CBC Full  -  ( 12 Feb 2018 06:48 )  WBC Count : 6.4 K/uL  Hemoglobin : 11.7 g/dL  Hematocrit : 33.0 %  Platelet Count - Automated : 182 K/uL  Mean Cell Volume : 97.3 fl  Mean Cell Hemoglobin : 34.5 pg  Mean Cell Hemoglobin Concentration : 35.5 gm/dL  Auto Neutrophil # : x  Auto Lymphocyte # : x  Auto Monocyte # : x  Auto Eosinophil # : x  Auto Basophil # : x  Auto Neutrophil % : x  Auto Lymphocyte % : x  Auto Monocyte % : x  Auto Eosinophil % : x  Auto Basophil % : x      02-12    141  |  109<H>  |  25<H>  ----------------------------<  105<H>  4.0   |  25  |  1.53<H>    Ca    8.6      12 Feb 2018 06:48    TPro  7.3  /  Alb  3.6  /  TBili  0.4  /  DBili  x   /  AST  20  /  ALT  15  /  AlkPhos  225<H>  02-10       PT/INR - ( 10 Feb 2018 19:31 )   PT: 10.9 sec;   INR: 1.00 ratio         PTT - ( 10 Feb 2018 19:31 )  PTT:30.5 sec    02-11 @ 10:58 CPK total:--, CKMB --, Troponin I - .021 ng/mL  02-11 @ 03:01 CPK total:--, CKMB --, Troponin I - .020 ng/mL  02-10 @ 19:31 CPK total:--, CKMB --, Troponin I - .015 ng/mL<L>    123 mg/dL, 49 mg/dL, 46 mg/dL, 140 mg/dL    2/12/2018  On Tele bed (at this time refusing monitor)  Echo = normal EF, moderate MS and AS  Carotid Duplex= up to 70% stenosis  CT head  negative  Cardiac enzymes  negative  Patient alert, says he tripped, and fell    Radiology:

## 2018-02-12 NOTE — PROGRESS NOTE ADULT - ASSESSMENT
89 yr old with pmh of CAD,cva,mitral regurg,renal insufficiency,prostate ca, hypothyroidis,dementia,peptic ulcer disease, GI bleed, insomnia, panic atatcks,cabg 10 yrs back.  no other complaint,hip surg,  hospitalized for cardiac problems. 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   will have PT Eval, and Dc plan to assisted living

## 2018-02-12 NOTE — PROGRESS NOTE ADULT - SUBJECTIVE AND OBJECTIVE BOX
Patient is a 89y old  Male who presents with a chief complaint of unresponsiveness and fall (2018 00:54)      INTERVAL HPI/OVERNIGHT EVENTS:no acute event overnight    Home Medications:  aspirin 325 mg oral tablet: 1 tab(s) orally once a day (10 Feb 2018 22:07)  chlorhexidine 0.12% mucous membrane liquid: mucous membrane 2 times a day (10 Feb 2018 22:07)  Colace 100 mg oral capsule: orally 3 times a day (10 Feb 2018 22:07)  Dulcolax Stool Softener 100 mg oral capsule: orally 3 times a day (10 Feb 2018 22:07)  hydrALAZINE 50 mg oral tablet: orally 3 times a day (10 Feb 2018 22:07)  lactulose 10 g/15 mL oral syrup: 30 milliliter(s) orally every other day (at bedtime) (10 Feb 2018 22:07)  Melatonin 3 mg oral tablet: 1 tab(s) orally once (at bedtime) (10 Feb 2018 22:07)  MiraLax oral powder for reconstitution: orally once a day (at bedtime) (10 Feb 2018 22:07)  Protonix 40 mg oral delayed release tablet: 1 tab(s) orally once a day (10 Feb 2018 22:07)  Requip 0.5 mg oral tablet: orally once a day (at bedtime) (10 Feb 2018 22:07)  Senna 8.6 mg oral tablet: 2 tab(s) orally once a day (at bedtime) (10 Feb 2018 22:07)  simvastatin 40 mg oral tablet: 1 tab(s) orally once a day (at bedtime) (10 Feb 2018 22:07)  Synthroid 25 mcg (0.025 mg) oral tablet: 1 tab(s) orally once a day (10 Feb 2018 22:07)  Tylenol: 650  orally 2 times a day (10 Feb 2018 22:07)  Vitamin B-12 500 mcg oral tablet: 1 tab(s) orally once a day (10 Feb 2018 22:07)      MEDICATIONS  (STANDING):  aspirin  chewable 81 milliGRAM(s) Oral daily  cyanocobalamin 500 MICROGram(s) Oral daily  docusate sodium 100 milliGRAM(s) Oral daily  heparin  Injectable 5000 Unit(s) SubCutaneous every 12 hours  hydrALAZINE 50 milliGRAM(s) Oral three times a day  levothyroxine 25 MICROGram(s) Oral daily  pantoprazole    Tablet 40 milliGRAM(s) Oral before breakfast  rOPINIRole 0.5 milliGRAM(s) Oral at bedtime  simvastatin 40 milliGRAM(s) Oral at bedtime  sodium chloride 0.9%. 1000 milliLiter(s) (50 mL/Hr) IV Continuous <Continuous>    MEDICATIONS  (PRN):  acetaminophen   Tablet. 650 milliGRAM(s) Oral every 6 hours PRN Moderate Pain (4 - 6)      Allergies    Drug Allergies Not Recorded  fish (Other)    Intolerances        REVIEW OF SYSTEMS:  CONSTITUTIONAL: No fever, weight loss, or fatigue  EYES: No eye pain, visual disturbances, or discharge  ENMT:  No difficulty hearing, tinnitus, vertigo; No sinus or throat pain  NECK: No pain or stiffness  BREASTS: No pain, masses, or nipple discharge  RESPIRATORY: No cough, wheezing, chills or hemoptysis; No shortness of breath  CARDIOVASCULAR: No chest pain, palpitations, dizziness, or leg swelling  GASTROINTESTINAL: No abdominal or epigastric pain. No nausea, vomiting, or hematemesis; No diarrhea or constipation. No melena or hematochezia.  GENITOURINARY: No dysuria, frequency, hematuria, or incontinence  NEUROLOGICAL: No headaches,has recent  memory loss,No loss of strength, numbness, or tremors  SKIN: No itching, burning, rashes, or lesions   LYMPH NODES: No enlarged glands  ENDOCRINE: No heat or cold intolerance; No hair loss  MUSCULOSKELETAL: No joint pain or swelling; No muscle, back, or extremity pain  PSYCHIATRIC: No depression, anxiety, mood swings, or difficulty sleeping  HEME/LYMPH: No easy bruising, or bleeding gums  ALLERGY AND IMMUNOLOGIC: No hives or eczema    Vital Signs Last 24 Hrs  T(C): 36.9 (2018 05:39), Max: 37.5 (2018 16:01)  T(F): 98.4 (2018 05:39), Max: 99.5 (2018 16:01)  HR: 82 (2018 05:39) (68 - 82)  BP: 171/72 (2018 05:39) (115/44 - 171/72)  BP(mean): --  RR: 16 (2018 05:39) (16 - 20)  SpO2: 96% (2018 05:39) (93% - 97%)    PHYSICAL EXAM:  GENERAL: NAD, well-groomed, well-developed  HEAD:  Atraumatic, Normocephalic  EYES: EOMI, PERRLA, conjunctiva and sclera clear  ENMT: Moist mucous membranes,   NECK: Supple, No JVD, Normal thyroid  NERVOUS SYSTEM:  Alert & Oriented X3, poor concentration; Motor Strength 4/5 B/L upper and lower extremities; DTRs 2+ intact and symmetric,,poor recent memory  CHEST/LUNG: Clear to percussion bilaterally; No rales, rhonchi, wheezing, or rubs  HEART: Regular rate and rhythm; No murmurs, rubs, or gallops  ABDOMEN: Soft, Nontender, Nondistended; Bowel sounds present  EXTREMITIES:  2+ Peripheral Pulses, No clubbing, cyanosis, or edema  LYMPH: No lymphadenopathy noted  SKIN: No rashes or lesions    LABS:                        11.7   6.4   )-----------( 182      ( 2018 06:48 )             33.0     02-12    141  |  109<H>  |  25<H>  ----------------------------<  105<H>  4.0   |  25  |  1.53<H>    Ca    8.6      2018 06:48    TPro  7.3  /  Alb  3.6  /  TBili  0.4  /  DBili  x   /  AST  20  /  ALT  15  /  AlkPhos  225<H>  02-10    PT/INR - ( 10 Feb 2018 19:31 )   PT: 10.9 sec;   INR: 1.00 ratio         PTT - ( 10 Feb 2018 19:31 )  PTT:30.5 sec  Urinalysis Basic - ( 10 Feb 2018 21:02 )    Color: Yellow / Appearance: Clear / S.005 / pH: x  Gluc: x / Ketone: Negative  / Bili: Negative / Urobili: Negative mg/dL   Blood: x / Protein: 30 mg/dL / Nitrite: Negative   Leuk Esterase: Negative / RBC: Negative /HPF / WBC Negative   Sq Epi: x / Non Sq Epi: Negative / Bacteria: Negative      CAPILLARY BLOOD GLUCOSE              RADIOLOGY & ADDITIONAL TESTS:    Imaging Personally Reviewed:  [ ] YES  [ ] NO    Consultant(s) Notes Reviewed:  [ ] YES  [ ] NO    Care Discussed with Consultants/Other Providers [ ] YES  [ ] NO

## 2018-07-16 NOTE — H&P ADULT - GENITOURINARY MALE
CM met with patient and family  Patient had numerous complaints about his care and lack of communication between interdisciplinary team  Discussed setting up an escalated team meeting to discuss the concerns with the team members  Palliative Care and [facility] are aware  Awaiting response from Oncology and Radiation at this time   Likely meeting will be scheduled for tomorrow, 7/17/2018 normal external genitalia

## 2018-11-16 NOTE — ED PROVIDER NOTE - CARDIAC, MLM
Patient is a 74y old  Male who presents with a chief complaint of fever (2018 13:49)      HPI:    74M PMH multiple myeloma (s/p neck radiation, stem cell transplant ), PE/DVT and pafib on xarelto,, BPH p/w fever and encephalopathy.  As per wife, pt went to get a MRI of his neck because he has a "myeloma tumor" which crushed his C3 vertebra and was being followed.  He then went to his oncologist, who said that his WBC was 2; afterwards, he went with his wife and son to [facility] and he was very fatigued.  He felt cold and had shaking chills; his wife took his T and it was 101.4; she called his PCP who prescribed Levaquin but she never got it.  Pt went to the bathroom, vomited, and then could not get up or move his feet.  EMS came, his T was 101.4 again, and they brought him.  Pt did have RLQ and midepigastric pain per wife.  No other complaints - no CP/SOB/cough/dysuria/diarrhea; nothing aside from fever, fatigue, and encephalopathy.    Pt had 1 episode of nausea and vomiting.  Pt has been urinating a lot as per his wife.      Denies CP/SOB/palpitations/abd pain/dysuria/hematuria/hematochezia/diarrhea/constipation (2018 05:06)    ER vitals:  Tm 101.5, P 146, /89.  RR 18 (RA).  LFTs mildly elevated, WBC 0.76, lact 4.8.  ESR 83, .  UA (-), RVP (-).  CTc/a/p no focus of infection.  Interdeterminate lesion in the liver that has slightly increased in size.      Pt started on vanco/meropenem.  ID consult called for further abx management.        REVIEW OF SYSTEMS:    Pt with AMS, lethargic.  Nonverbal, unable to assess      PAST MEDICAL & SURGICAL HISTORY:  Pulmonary embolism  HTN (hypertension)  Multiple myeloma  Atrial fibrillation  No significant past surgical history      Allergies    No Known Allergies    Intolerances        FAMILY HISTORY:  No pertinent family history in first degree relatives      SOCIAL HISTORY:    Marital Status:  ( x  )    (   ) Single    (   )    (  )   	Occupation: not working  	Lives with: (  ) alone  ( x ) children   (x  ) spouse   (  ) parents  (  ) other  	Substance Use (street drugs): (  x) never used  (  ) other:  	Tobacco Usage:  (   ) never smoked   (  x ) former smoker   (   ) current smoker  (     ) pack year  (        ) last cigarette date  Alcohol Usage: none    MEDICATIONS  (STANDING):  meropenem  IVPB 1000 milliGRAM(s) IV Intermittent every 8 hours  vancomycin  IVPB 1000 milliGRAM(s) IV Intermittent every 12 hours    MEDICATIONS  (PRN):  acetaminophen   Tablet .. 650 milliGRAM(s) Oral every 6 hours PRN Temp greater or equal to 38C (100.4F), Mild Pain (1 - 3), Moderate Pain (4 - 6)      Vital Signs Last 24 Hrs  T(C): 37.2 (2018 12:43), Max: 38.6 (2018 04:01)  T(F): 99 (2018 12:43), Max: 101.5 (2018 04:01)  HR: 102 (2018 12:43) (102 - 179)  BP: 106/50 (2018 12:43) (106/50 - 169/89)  BP(mean): --  RR: 18 (2018 12:43) (18 - 25)  SpO2: 98% (2018 12:43) (97% - 100%)    PHYSICAL EXAM:    GENERAL: lethargic, well-groomed  HEAD:  Atraumatic, Normocephalic  EYES: EOMI, PERRLA, conjunctiva and sclera clear  ENMT: No tonsillar erythema, exudates, or enlargement; Moist mucous membranes  NECK: Supple, No JVD  CHEST/LUNG: Clear to percussion bilaterally; No rales, rhonchi, wheezing, or rubs  HEART: Regular rate and rhythm; No murmurs, rubs, or gallops  ABDOMEN: Soft, Nontender, Nondistended; Bowel sounds present  EXTREMITIES:  2+ Peripheral Pulses, No clubbing, cyanosis, or edema  LYMPH: No lymphadenopathy noted  SKIN: dry flaky skin b/l LE    LABS:  CBC Full  -  ( 2018 02:39 )  WBC Count : 0.76 K/uL  Hemoglobin : 9.3 g/dL  Hematocrit : 28.4 %  Platelet Count - Automated : 95 K/uL  Mean Cell Volume : 93.1 fL  Mean Cell Hemoglobin : 30.5 pg  Mean Cell Hemoglobin Concentration : 32.7 %  Auto Neutrophil # : 0.48 K/uL  Auto Lymphocyte # : 0.11 K/uL  Auto Monocyte # : 0.14 K/uL  Auto Eosinophil # : 0.02 K/uL  Auto Basophil # : 0.00 K/uL  Auto Neutrophil % : 63.2 %  Auto Lymphocyte % : 14.5 %  Auto Monocyte % : 18.4 %  Auto Eosinophil % : 2.6 %  Auto Basophil % : 0.0 %          137  |  101  |  17  ----------------------------<  212<H>  4.0   |  20<L>  |  1.12    Ca    9.3      2018 02:39  Phos  3.3       Mg     1.6         TPro  6.7  /  Alb  3.6  /  TBili  0.7  /  DBili  x   /  AST  47<H>  /  ALT  60<H>  /  AlkPhos  54        LIVER FUNCTIONS - ( 2018 02:39 )  Alb: 3.6 g/dL / Pro: 6.7 g/dL / ALK PHOS: 54 u/L / ALT: 60 u/L / AST: 47 u/L / GGT: x                               MICROBIOLOGY:        Urinalysis Basic - ( 2018 03:22 )    Color: LIGHT YELLOW / Appearance: CLEAR / S.014 / pH: 5.5  Gluc: 1000 / Ketone: NEGATIVE  / Bili: NEGATIVE / Urobili: NORMAL   Blood: TRACE / Protein: 20 / Nitrite: NEGATIVE   Leuk Esterase: NEGATIVE / RBC: 6-10 / WBC 0-2   Sq Epi: OCC / Non Sq Epi: x / Bacteria: NEGATIVE      Culture - Blood in AM (18 @ 07:34)    Culture - Blood:   NO ORGANISMS ISOLATED    Specimen Source: BLOOD    Culture - Blood in AM (18 @ 06:28)    Culture - Blood:   NO ORGANISMS ISOLATED    Specimen Source: BLOOD PERIPHERAL    Culture - Urine (18 @ 05:38)    -  Trimethoprim/Sulfamethoxazole: S <=0.5/9.5 SVETLANA    Culture - Urine:   EC^Escherichia coli  COLONY COUNT: > = 100,000 CFU/ML    Culture - Urine:   RESULT CALLED TO / READ BACK:INES BARGER RN/Y  DATE / TIME CALLED: 18 2077  CALLED BY: KAYLEE HERZOG    -  Tobramycin: S <=2 SVETLANA    -  Tigecycline: S <=1 SVETLANA    -  Piperacillin/Tazobactam: R <=8 SVETLANA    -  Meropenem: S <=1 SVETLANA    -  Nitrofurantoin: S <=32 SVETLANA    -  Ampicillin: R >16 SVETLANA    -  Amikacin: S <=8 SVETLANA    -  Cefepime: R >16 SVETLANA    -  Cefazolin: R >16 SVETLANA    -  Aztreonam: R >16 SVETLANA    -  Ampicillin/Sulbactam: R 16/8 SVETLANA    -  Levofloxacin: S <=1 SVETLANA    -  Imipenem: S <=1 SVETLANA    -  Gentamicin: S 2 SVETLANA    -  Ertapenem: S <=0.5 SVETLANA    -  Ciprofloxacin: S <=0.5 SVETLANA    -  Ceftriaxone: R >32 SVETLANA    -  Ceftazidime: R <=1 SVETLANA    -  Cefoxitin: S <=4 SVETLANA    Specimen Source: URINE MIDSTREAM    Organism Identification: E.COLI ESBL POSITIVE    Organism: E.COLI ESBL POSITIVE  COLONY COUNT: > = 100,000 CFU/ML    Method Type: NEGATIVE SVETLANA 43              RADIOLOGY: Normal rate, regular rhythm.  Heart sounds S1, S2.  2+ pulses femoral

## 2018-12-13 NOTE — PHYSICAL THERAPY INITIAL EVALUATION ADULT - REFERRAL TO ANOTHER SERVICE NEEDED, PT EVAL
"Vm \"I need something called in or be seen this afternoon or early tomorrow? I have had this cough and congestion for a week and it has settled in my chest and it burns when I cough or deep breath\"  " social work

## 2019-03-14 NOTE — ED PROVIDER NOTE - PROGRESS NOTE DETAILS
pt awake, answering questions, following commands, granddaughter at bedside, states he at times of significant stress will become catatonic, is not aware of times of him not following commands at all, unclear what exactly transpired tonight, discussed admission for further evaluation 5

## 2021-05-30 ENCOUNTER — EMERGENCY (EMERGENCY)
Facility: HOSPITAL | Age: 86
LOS: 1 days | Discharge: NURSING, REHAB OR LONG-TERM CARE | End: 2021-05-30
Attending: EMERGENCY MEDICINE | Admitting: EMERGENCY MEDICINE
Payer: MEDICARE

## 2021-05-30 VITALS
SYSTOLIC BLOOD PRESSURE: 157 MMHG | RESPIRATION RATE: 16 BRPM | OXYGEN SATURATION: 98 % | TEMPERATURE: 98 F | DIASTOLIC BLOOD PRESSURE: 80 MMHG | HEART RATE: 77 BPM

## 2021-05-30 VITALS
OXYGEN SATURATION: 98 % | DIASTOLIC BLOOD PRESSURE: 81 MMHG | HEART RATE: 83 BPM | SYSTOLIC BLOOD PRESSURE: 165 MMHG | HEIGHT: 69 IN | WEIGHT: 130.07 LBS | RESPIRATION RATE: 14 BRPM | TEMPERATURE: 98 F

## 2021-05-30 DIAGNOSIS — Z95.1 PRESENCE OF AORTOCORONARY BYPASS GRAFT: Chronic | ICD-10-CM

## 2021-05-30 PROCEDURE — 70450 CT HEAD/BRAIN W/O DYE: CPT

## 2021-05-30 PROCEDURE — 99284 EMERGENCY DEPT VISIT MOD MDM: CPT | Mod: 25

## 2021-05-30 PROCEDURE — 70450 CT HEAD/BRAIN W/O DYE: CPT | Mod: 26,MA

## 2021-05-30 PROCEDURE — 99284 EMERGENCY DEPT VISIT MOD MDM: CPT

## 2021-05-30 PROCEDURE — 72170 X-RAY EXAM OF PELVIS: CPT

## 2021-05-30 PROCEDURE — 72170 X-RAY EXAM OF PELVIS: CPT | Mod: 26

## 2021-05-30 NOTE — ED ADULT NURSE REASSESSMENT NOTE - NS ED NURSE REASSESS COMMENT FT1
Transport called [No Acute Distress] : no acute distress [Well Nourished] : well nourished [Well Developed] : well developed [Well-Appearing] : well-appearing [No Respiratory Distress] : no respiratory distress  [Clear to Auscultation] : lungs were clear to auscultation bilaterally [Normal Rate] : normal rate  [Regular Rhythm] : with a regular rhythm [No Edema] : there was no peripheral edema [Soft] : abdomen soft [Non Tender] : non-tender [Normal Affect] : the affect was normal [Normal Insight/Judgement] : insight and judgment were intact

## 2021-05-30 NOTE — ED ADULT NURSE REASSESSMENT NOTE - NS ED NURSE REASSESS COMMENT FT1
Patient d/c back to facility. Awaiting CT official results prior to calling transportation services.

## 2021-05-30 NOTE — ED PROVIDER NOTE - PROGRESS NOTE DETAILS
CT brain with chronic infarcts. No definite acute finding. Will recommend outpt neuro eval and possible MRI for further eval.

## 2021-05-30 NOTE — ED ADULT NURSE NOTE - INTERVENTIONS DEFINITIONS
Instruct patient to call for assistance/Non-slip footwear when patient is off stretcher/Monitor for mental status changes and reorient to person, place, and time/Provide visual clues: red socks

## 2021-05-30 NOTE — ED PROVIDER NOTE - NSFOLLOWUPINSTRUCTIONS_ED_ALL_ED_FT
Fall Prevention for Older Adults    WHAT YOU NEED TO KNOW:    As you age, your muscles weaken and your risk for falls increases. Your risk also increases if you take medicines that make you sleepy or dizzy. You may also be at risk if you have vision or joint problems, have low blood pressure, or are not active.    DISCHARGE INSTRUCTIONS:    Call 911 or have someone else call if:   •You have fallen and are unconscious.      •You have fallen and cannot move part of your body.      Contact your healthcare provider if:   •You have fallen and have pain or a headache.      •You have questions or concerns about your condition or care.      Fall prevention tips:   •Stay active. Exercise can help strengthen your muscles and improve your balance. Your healthcare provider may recommend water aerobics, walking, or [employer]. He or she may also recommend physical therapy to improve your coordination. Never start an exercise program without asking your healthcare provider first.  Water Aerobics for Seniors       Malcolm Chi for Seniors           •Wear shoes that fit well and have soles that . Wear shoes both inside and outside. Use slippers with good . Avoid shoes with high heels.      •Use assistive devices as directed. Your healthcare provider may suggest that you use a cane or walker to help you keep your balance. You may need to have grab bars put in your bathroom near the toilet or in the shower.      •Stand or sit up slowly. This may help you keep your balance and prevent falls.      •Wear a personal alarm. This is a device that allows you to call 911 if you need help. Ask for more information on personal alarms.      •Manage your medical conditions.  Keep all appointments with your healthcare providers. Visit your eye doctor as directed.      Home safety tips:     Fall Prevention for Seniors     •Add items to prevent falls in the bathroom. Put nonslip strips on your bath or shower floor to prevent you from slipping. Use a bath mat if you do not have carpet in the bathroom. This will prevent you from falling when you step out of the bath or shower. Use a shower seat so you do not need to stand while you shower. Sit on the toilet or a chair in your bathroom to dry yourself and put on clothing. This will prevent you from losing your balance from drying or dressing yourself while you are standing.      •Keep paths clear. Remove books, shoes, and other objects from walkways and stairs. Place cords for telephones and lamps out of the way so that you do not need to walk over them. Tape them down if you cannot move them. Remove small rugs. If you cannot remove a rug, secure it with double-sided tape. This will prevent you from tripping.      •Install bright lights in your home. Use night lights to help light paths to the bathroom or kitchen. Always turn on the light before you start walking.      •Keep items you use often on shelves within reach. Do not use a step stool to help you reach an item.      •Paint or place reflective tape on the edges of your stairs. This will help you see the stairs better.      Follow up with your healthcare provider as directed: Write down your questions so you remember to ask them during your visits.

## 2021-05-30 NOTE — ED PROVIDER NOTE - SKIN, MLM
Skin normal color for race, warm, dry and intact. No evidence of rash. +purpuric lesions noted on both arms, no skin tears or active bleeding.

## 2021-05-30 NOTE — ED PROVIDER NOTE - CLINICAL SUMMARY MEDICAL DECISION MAKING FREE TEXT BOX
Dementia pt fell at assisted living, no obvious injury, will do CT brain and X ray pelvis if negative will return to assisted living.

## 2021-05-30 NOTE — ED PROVIDER NOTE - PATIENT PORTAL LINK FT
You can access the FollowMyHealth Patient Portal offered by [facility] by registering at the following website: [email or web address]. By joining [employer]’s FollowMyHealth portal, you will also be able to view your health information using other applications (apps) compatible with our system.

## 2021-05-30 NOTE — ED PROVIDER NOTE - MUSCULOSKELETAL, MLM
Spine appears normal, range of motion is not limited, no muscle or joint tenderness. No spinal tenderness, no hip tenderness

## 2021-05-30 NOTE — ED ADULT NURSE NOTE - OBJECTIVE STATEMENT
MATEO from [facility]. They stated patient fell Friday and then again today, no loc c/o right hip pain. As per EMS patient was ambulatory upon their arrival and denies pain. Patient denies pain at this time. No n/v, moving all extremeties well. Partially healing skin tear noted to right forearm, bandage in place on arrival. MATEO from [facility]. They stated patient fell Friday and then again today, no loc c/o right hip pain. As per EMS patient was ambulatory upon their arrival and denies pain. Patient denies pain at this time. No n/v, moving all extremities well. Partially healing small abrasion noted to right forearm, bandage in place on arrival. Skin cleaned, bacitracin and bandaid applied.

## 2021-05-30 NOTE — ED PROVIDER NOTE - OBJECTIVE STATEMENT
89 y/o male with a PMHx of CAD s/p CABG x4, CVA, HLD, Mitral regurgitation, Renal insufficiency, Prostate CA, Hypothyroidism, Dementia, PUD, GI bleed, panic attacks, presents to the ED BIBA from [facility] s/p fall. EMS reports pt fell 2 days ago and fell today and with reported R hip pain. Pt denies falling. Pt denies pain or injury. Full HPI is unobtainable due to pt being a poor historian. 89 y/o male with a PMHx of CAD s/p CABG x4, CVA, HLD, Mitral regurgitation, Renal insufficiency, Prostate CA, Hypothyroidism, Dementia, PUD, GI bleed, panic attacks, presents to the ED BIBA from [facility] s/p fall. EMS reports pt fell 2 days ago and fell today with reported R hip pain. Pt denies falling. Pt denies pain or injury. Full HPI is unobtainable due to pt being a poor historian.

## 2021-05-30 NOTE — ED PROVIDER NOTE - ENMT, MLM
Airway patent, Nasal mucosa clear. Mouth with normal mucosa. Throat has no vesicles, no oropharyngeal exudates and uvula is midline. Head scalp atraumatic non-tender.

## 2021-05-31 PROBLEM — I10 ESSENTIAL (PRIMARY) HYPERTENSION: Chronic | Status: ACTIVE | Noted: 2018-02-10

## 2021-05-31 PROBLEM — E78.00 PURE HYPERCHOLESTEROLEMIA, UNSPECIFIED: Chronic | Status: ACTIVE | Noted: 2018-02-10

## 2021-05-31 PROBLEM — F03.90 UNSPECIFIED DEMENTIA WITHOUT BEHAVIORAL DISTURBANCE: Chronic | Status: ACTIVE | Noted: 2018-02-10

## 2021-05-31 PROBLEM — I25.10 ATHEROSCLEROTIC HEART DISEASE OF NATIVE CORONARY ARTERY WITHOUT ANGINA PECTORIS: Chronic | Status: ACTIVE | Noted: 2018-02-10

## 2021-05-31 PROBLEM — E03.9 HYPOTHYROIDISM, UNSPECIFIED: Chronic | Status: ACTIVE | Noted: 2018-02-10

## 2021-07-03 ENCOUNTER — INPATIENT (INPATIENT)
Facility: HOSPITAL | Age: 86
LOS: 1 days | Discharge: HOSPICE | DRG: 951 | End: 2021-07-05
Attending: HOSPITALIST | Admitting: HOSPITALIST
Payer: MEDICARE

## 2021-07-03 ENCOUNTER — EMERGENCY (EMERGENCY)
Facility: HOSPITAL | Age: 86
LOS: 1 days | Discharge: ANOTHER HOSPITAL | End: 2021-07-03
Attending: EMERGENCY MEDICINE | Admitting: EMERGENCY MEDICINE
Payer: MEDICARE

## 2021-07-03 VITALS
WEIGHT: 118.39 LBS | HEART RATE: 99 BPM | RESPIRATION RATE: 20 BRPM | DIASTOLIC BLOOD PRESSURE: 89 MMHG | OXYGEN SATURATION: 97 % | SYSTOLIC BLOOD PRESSURE: 176 MMHG | HEIGHT: 69 IN | TEMPERATURE: 97 F

## 2021-07-03 VITALS
SYSTOLIC BLOOD PRESSURE: 169 MMHG | HEART RATE: 116 BPM | RESPIRATION RATE: 18 BRPM | OXYGEN SATURATION: 97 % | DIASTOLIC BLOOD PRESSURE: 89 MMHG

## 2021-07-03 VITALS
HEART RATE: 106 BPM | OXYGEN SATURATION: 98 % | RESPIRATION RATE: 18 BRPM | SYSTOLIC BLOOD PRESSURE: 171 MMHG | DIASTOLIC BLOOD PRESSURE: 95 MMHG

## 2021-07-03 DIAGNOSIS — Z95.1 PRESENCE OF AORTOCORONARY BYPASS GRAFT: Chronic | ICD-10-CM

## 2021-07-03 DIAGNOSIS — I61.9 NONTRAUMATIC INTRACEREBRAL HEMORRHAGE, UNSPECIFIED: ICD-10-CM

## 2021-07-03 LAB
ABO RH CONFIRMATION: SIGNIFICANT CHANGE UP
ALBUMIN SERPL ELPH-MCNC: 3.1 G/DL — LOW (ref 3.3–5)
ALP SERPL-CCNC: 214 U/L — HIGH (ref 30–120)
ALT FLD-CCNC: 32 U/L DA — SIGNIFICANT CHANGE UP (ref 10–60)
ANION GAP SERPL CALC-SCNC: 12 MMOL/L — SIGNIFICANT CHANGE UP (ref 5–17)
APTT BLD: 29.6 SEC — SIGNIFICANT CHANGE UP (ref 27.5–35.5)
APTT BLD: 33.6 SEC — SIGNIFICANT CHANGE UP (ref 27.5–35.5)
AST SERPL-CCNC: 44 U/L — HIGH (ref 10–40)
BASE EXCESS BLDV CALC-SCNC: -3 MMOL/L — LOW (ref -2–2)
BASOPHILS # BLD AUTO: 0.04 K/UL — SIGNIFICANT CHANGE UP (ref 0–0.2)
BASOPHILS NFR BLD AUTO: 0.3 % — SIGNIFICANT CHANGE UP (ref 0–2)
BILIRUB SERPL-MCNC: 0.6 MG/DL — SIGNIFICANT CHANGE UP (ref 0.2–1.2)
BLD GP AB SCN SERPL QL: SIGNIFICANT CHANGE UP
BUN SERPL-MCNC: 35 MG/DL — HIGH (ref 7–23)
CALCIUM SERPL-MCNC: 8.8 MG/DL — SIGNIFICANT CHANGE UP (ref 8.4–10.5)
CHLORIDE SERPL-SCNC: 106 MMOL/L — SIGNIFICANT CHANGE UP (ref 96–108)
CO2 SERPL-SCNC: 23 MMOL/L — SIGNIFICANT CHANGE UP (ref 22–31)
CREAT SERPL-MCNC: 1.73 MG/DL — HIGH (ref 0.5–1.3)
EOSINOPHIL # BLD AUTO: 0.07 K/UL — SIGNIFICANT CHANGE UP (ref 0–0.5)
EOSINOPHIL NFR BLD AUTO: 0.6 % — SIGNIFICANT CHANGE UP (ref 0–6)
GLUCOSE SERPL-MCNC: 125 MG/DL — HIGH (ref 70–99)
HCO3 BLDV-SCNC: 22 MMOL/L — SIGNIFICANT CHANGE UP (ref 21–29)
HCT VFR BLD CALC: 36.1 % — LOW (ref 39–50)
HCT VFR BLD CALC: 36.4 % — LOW (ref 39–50)
HGB BLD-MCNC: 11.5 G/DL — LOW (ref 13–17)
HGB BLD-MCNC: 11.7 G/DL — LOW (ref 13–17)
HOROWITZ INDEX BLDV+IHG-RTO: 21 — SIGNIFICANT CHANGE UP
IMM GRANULOCYTES NFR BLD AUTO: 0.4 % — SIGNIFICANT CHANGE UP (ref 0–1.5)
INR BLD: 1.05 RATIO — SIGNIFICANT CHANGE UP (ref 0.88–1.16)
INR BLD: 1.14 RATIO — SIGNIFICANT CHANGE UP (ref 0.88–1.16)
LYMPHOCYTES # BLD AUTO: 1.26 K/UL — SIGNIFICANT CHANGE UP (ref 1–3.3)
LYMPHOCYTES # BLD AUTO: 11 % — LOW (ref 13–44)
MCHC RBC-ENTMCNC: 31.9 GM/DL — LOW (ref 32–36)
MCHC RBC-ENTMCNC: 32.1 GM/DL — SIGNIFICANT CHANGE UP (ref 32–36)
MCHC RBC-ENTMCNC: 32.6 PG — SIGNIFICANT CHANGE UP (ref 27–34)
MCHC RBC-ENTMCNC: 32.7 PG — SIGNIFICANT CHANGE UP (ref 27–34)
MCV RBC AUTO: 101.4 FL — HIGH (ref 80–100)
MCV RBC AUTO: 102.6 FL — HIGH (ref 80–100)
MONOCYTES # BLD AUTO: 1.14 K/UL — HIGH (ref 0–0.9)
MONOCYTES NFR BLD AUTO: 9.9 % — SIGNIFICANT CHANGE UP (ref 2–14)
NEUTROPHILS # BLD AUTO: 8.9 K/UL — HIGH (ref 1.8–7.4)
NEUTROPHILS NFR BLD AUTO: 77.8 % — HIGH (ref 43–77)
NRBC # BLD: 0 /100 WBCS — SIGNIFICANT CHANGE UP (ref 0–0)
PCO2 BLDV: 39 MMHG — SIGNIFICANT CHANGE UP (ref 35–50)
PH BLDV: 7.36 — SIGNIFICANT CHANGE UP (ref 7.35–7.45)
PLATELET # BLD AUTO: 202 K/UL — SIGNIFICANT CHANGE UP (ref 150–400)
PLATELET # BLD AUTO: 243 K/UL — SIGNIFICANT CHANGE UP (ref 150–400)
PO2 BLDV: 75 MMHG — HIGH (ref 25–45)
POTASSIUM SERPL-MCNC: 5.4 MMOL/L — HIGH (ref 3.5–5.3)
POTASSIUM SERPL-SCNC: 5.4 MMOL/L — HIGH (ref 3.5–5.3)
PROT SERPL-MCNC: 7.2 G/DL — SIGNIFICANT CHANGE UP (ref 6–8.3)
PROTHROM AB SERPL-ACNC: 12.7 SEC — SIGNIFICANT CHANGE UP (ref 10.6–13.6)
PROTHROM AB SERPL-ACNC: 13.1 SEC — SIGNIFICANT CHANGE UP (ref 10.6–13.6)
RBC # BLD: 3.52 M/UL — LOW (ref 4.2–5.8)
RBC # BLD: 3.59 M/UL — LOW (ref 4.2–5.8)
RBC # FLD: 14.6 % — HIGH (ref 10.3–14.5)
RBC # FLD: 14.6 % — HIGH (ref 10.3–14.5)
SAO2 % BLDV: 93 % — HIGH (ref 67–88)
SARS-COV-2 RNA SPEC QL NAA+PROBE: SIGNIFICANT CHANGE UP
SODIUM SERPL-SCNC: 141 MMOL/L — SIGNIFICANT CHANGE UP (ref 135–145)
TROPONIN I SERPL-MCNC: 0.01 NG/ML — LOW (ref 0.02–0.06)
WBC # BLD: 11.32 K/UL — HIGH (ref 3.8–10.5)
WBC # BLD: 11.46 K/UL — HIGH (ref 3.8–10.5)
WBC # FLD AUTO: 11.32 K/UL — HIGH (ref 3.8–10.5)
WBC # FLD AUTO: 11.46 K/UL — HIGH (ref 3.8–10.5)

## 2021-07-03 PROCEDURE — 85730 THROMBOPLASTIN TIME PARTIAL: CPT

## 2021-07-03 PROCEDURE — 70450 CT HEAD/BRAIN W/O DYE: CPT

## 2021-07-03 PROCEDURE — 93010 ELECTROCARDIOGRAM REPORT: CPT

## 2021-07-03 PROCEDURE — 36415 COLL VENOUS BLD VENIPUNCTURE: CPT

## 2021-07-03 PROCEDURE — 99285 EMERGENCY DEPT VISIT HI MDM: CPT | Mod: 25

## 2021-07-03 PROCEDURE — 99221 1ST HOSP IP/OBS SF/LOW 40: CPT

## 2021-07-03 PROCEDURE — 71045 X-RAY EXAM CHEST 1 VIEW: CPT | Mod: 26

## 2021-07-03 PROCEDURE — 85025 COMPLETE CBC W/AUTO DIFF WBC: CPT

## 2021-07-03 PROCEDURE — 85610 PROTHROMBIN TIME: CPT

## 2021-07-03 PROCEDURE — 99291 CRITICAL CARE FIRST HOUR: CPT

## 2021-07-03 PROCEDURE — 80053 COMPREHEN METABOLIC PANEL: CPT

## 2021-07-03 PROCEDURE — 82803 BLOOD GASES ANY COMBINATION: CPT

## 2021-07-03 PROCEDURE — 84484 ASSAY OF TROPONIN QUANT: CPT

## 2021-07-03 PROCEDURE — 82962 GLUCOSE BLOOD TEST: CPT

## 2021-07-03 PROCEDURE — 70450 CT HEAD/BRAIN W/O DYE: CPT | Mod: 26,MA

## 2021-07-03 PROCEDURE — 99223 1ST HOSP IP/OBS HIGH 75: CPT

## 2021-07-03 PROCEDURE — 99285 EMERGENCY DEPT VISIT HI MDM: CPT

## 2021-07-03 PROCEDURE — 87635 SARS-COV-2 COVID-19 AMP PRB: CPT

## 2021-07-03 PROCEDURE — 99497 ADVNCD CARE PLAN 30 MIN: CPT | Mod: 25

## 2021-07-03 PROCEDURE — 93005 ELECTROCARDIOGRAM TRACING: CPT

## 2021-07-03 PROCEDURE — 71045 X-RAY EXAM CHEST 1 VIEW: CPT

## 2021-07-03 RX ORDER — FENTANYL CITRATE 50 UG/ML
25 INJECTION INTRAVENOUS ONCE
Refills: 0 | Status: DISCONTINUED | OUTPATIENT
Start: 2021-07-03 | End: 2021-07-03

## 2021-07-03 RX ORDER — TETANUS AND DIPHTHERIA TOXOIDS ADSORBED 2; 2 [LF]/.5ML; [LF]/.5ML
0.5 INJECTION INTRAMUSCULAR ONCE
Refills: 0 | Status: COMPLETED | OUTPATIENT
Start: 2021-07-03 | End: 2021-07-03

## 2021-07-03 RX ORDER — ERGOCALCIFEROL 1.25 MG/1
1 CAPSULE ORAL
Qty: 0 | Refills: 0 | DISCHARGE

## 2021-07-03 RX ORDER — PANTOPRAZOLE SODIUM 20 MG/1
1 TABLET, DELAYED RELEASE ORAL
Qty: 0 | Refills: 0 | DISCHARGE

## 2021-07-03 RX ORDER — TETANUS TOXOID, REDUCED DIPHTHERIA TOXOID AND ACELLULAR PERTUSSIS VACCINE, ADSORBED 5; 2.5; 8; 8; 2.5 [IU]/.5ML; [IU]/.5ML; UG/.5ML; UG/.5ML; UG/.5ML
0.5 SUSPENSION INTRAMUSCULAR ONCE
Refills: 0 | Status: DISCONTINUED | OUTPATIENT
Start: 2021-07-03 | End: 2021-07-03

## 2021-07-03 RX ORDER — SIMVASTATIN 20 MG/1
1 TABLET, FILM COATED ORAL
Qty: 0 | Refills: 0 | DISCHARGE

## 2021-07-03 RX ORDER — SENNA PLUS 8.6 MG/1
2 TABLET ORAL
Qty: 0 | Refills: 0 | DISCHARGE

## 2021-07-03 RX ORDER — DOCUSATE SODIUM 100 MG
0 CAPSULE ORAL
Qty: 0 | Refills: 0 | DISCHARGE

## 2021-07-03 RX ORDER — ASPIRIN/CALCIUM CARB/MAGNESIUM 324 MG
1 TABLET ORAL
Qty: 0 | Refills: 0 | DISCHARGE

## 2021-07-03 RX ORDER — ZINC SULFATE TAB 220 MG (50 MG ZINC EQUIVALENT) 220 (50 ZN) MG
0 TAB ORAL
Qty: 0 | Refills: 0 | DISCHARGE

## 2021-07-03 RX ORDER — LANOLIN ALCOHOL/MO/W.PET/CERES
1 CREAM (GRAM) TOPICAL
Qty: 0 | Refills: 0 | DISCHARGE

## 2021-07-03 RX ORDER — LEVOTHYROXINE SODIUM 125 MCG
1 TABLET ORAL
Qty: 0 | Refills: 0 | DISCHARGE

## 2021-07-03 RX ORDER — CHLORHEXIDINE GLUCONATE 213 G/1000ML
0 SOLUTION TOPICAL
Qty: 0 | Refills: 0 | DISCHARGE

## 2021-07-03 RX ORDER — PREGABALIN 225 MG/1
1 CAPSULE ORAL
Qty: 0 | Refills: 0 | DISCHARGE

## 2021-07-03 RX ORDER — HYDRALAZINE HCL 50 MG
0 TABLET ORAL
Qty: 0 | Refills: 0 | DISCHARGE

## 2021-07-03 RX ORDER — ACETAMINOPHEN 500 MG
650 TABLET ORAL
Qty: 0 | Refills: 0 | DISCHARGE

## 2021-07-03 RX ORDER — ASCORBIC ACID 60 MG
1 TABLET,CHEWABLE ORAL
Qty: 0 | Refills: 0 | DISCHARGE

## 2021-07-03 RX ORDER — ROPINIROLE 8 MG/1
0 TABLET, FILM COATED, EXTENDED RELEASE ORAL
Qty: 0 | Refills: 0 | DISCHARGE

## 2021-07-03 RX ORDER — METOPROLOL TARTRATE 50 MG
0.5 TABLET ORAL
Qty: 0 | Refills: 0 | DISCHARGE

## 2021-07-03 RX ADMIN — FENTANYL CITRATE 25 MICROGRAM(S): 50 INJECTION INTRAVENOUS at 21:50

## 2021-07-03 RX ADMIN — TETANUS AND DIPHTHERIA TOXOIDS ADSORBED 0.5 MILLILITER(S): 2; 2 INJECTION INTRAMUSCULAR at 22:49

## 2021-07-03 NOTE — H&P ADULT - ASSESSMENT
89yoM hx dementia, CAD s/p CABG, prior CVA, CKD, prostate CA, PUD, HTN, hypothyroidism who was transferred from [facility] for acute L-MCA CVA with hemorrhagic conversation, who is comfort measures only    CVA with hemorrhagic conversion  -CT head at OSH showing acute L-parietotemporal parenchymal hemorrhage with surrounding edema and acute parenchymal hemorrhage in L-insular region, likely from acute L-MCA infarct with hemorrhagic conversion  -Seen by NSGY in ED, deemed poor surgical candidate and no neurosurgical interventions recommended  -GOC discussed with daughter/HCP [place] via phone, see GOC for more details  -Pt is DNR/DNI with full comfort measures  -Morphine 2mg PRN q2hr for signs of pain or dyspnea  -Ativan 1mg PRN q4hr for signs of anxiety  -Robinul 0.2mg PRN q4hr PRN for excess secretions  -NPO due to poor mental status, if has some recovery of mental status, can consider pleasure feeds  -No blood draws, monitor or vital sign checks  -Palliative care consult to assist with symptom management and end of life care  -Daughter requesting  come to bedside  -ED ALICJA Prasad placed call to on-call , who state he will arrive in morning    Prophylactic measure  -No pharmacologic AC due to comfort measures

## 2021-07-03 NOTE — CONSULT NOTE ADULT - SUBJECTIVE AND OBJECTIVE BOX
HISTORY OF PRESENT ILLNESS:   89 y/o male with a PMHx of CAD s/p CABG x4, CVA, HLD, Mitral regurgitation, Renal insufficiency, Prostate CA, Hypothyroidism, Dementia, PUD, GI bleed, panic attacks, who was transferred from [facility] for left temporal intraparenchymal hemorrhage. Patient was brought to [facility] from rehab center after he was found to have right sided weakness. Patient is currently awake, not following commands, nonverbal (only moans to pain). Unable to obtain ROS due to mental status.  PAST MEDICAL & SURGICAL HISTORY:    FAMILY HISTORY:  Unknown     SOCIAL HISTORY:   Unknown       Allergies: NLDA    REVIEW OF SYSTEMS:  Unable to obtain ROS due ot mental status       Neuro:  fentaNYL    Injectable 25 MICROGram(s) IV Push Once    OTHER:  diphtheria/tetanus Vaccine - Adult 0.5 milliLiter(s) IntraMuscular once        Vital Signs Last 24 Hrs  T(C): --  T(F): --  HR: 89 (03 Jul 2021 22:00) (89 - 116)  BP: 141/67 (03 Jul 2021 22:00) (141/67 - 169/89)  BP(mean): 95 (03 Jul 2021 22:00) (95 - 95)  RR: 20 (03 Jul 2021 22:00) (18 - 20)  SpO2: 97% (03 Jul 2021 22:00) (97% - 97%)      PHYSICAL EXAM:  GENERAL: Awake, not alert, not following commands, spontaneously moving LUE/LLE  HEAD:  Atraumatic, normocephalic  EYES: Conjunctiva and sclera clear; Right pupil 3mm and L pupil 2mm minimally reactive   MENTAL STATUS: Awake, Opens eyes spontaneously, nonverbal- mild moaning to pain. not following command, Spontaneous nonpurposeful movement in LUE/LLE. No movement in RUE, RLE contracture   CHEST/LUNG: Respirations non-labored  ABDOMEN: Soft, nontenders  SKIN: Warm, dry      RADIOLOGY & ADDITIONAL STUDIES:  CT head from [facility] showing left temporal/parietal ICH     CAPRINI SCORE [CLOT]:  Patient has an estimated Caprini score of greater than 5.  However, the patient's unique clinical situation will be addressed in an individual manner to determine appropriate anticoagulation treatment, if any.   HISTORY OF PRESENT ILLNESS:   89 y/o male with a PMHx of CAD s/p CABG x4, CVA, HLD, Mitral regurgitation, Renal insufficiency, Prostate CA, Hypothyroidism, Dementia, PUD, GI bleed, panic attacks, who was transferred from [facility] for left temporal intraparenchymal hemorrhage. Patient was brought to [facility] from [facility] for difficulty speaking and R sided weakness. Patient is currently awake, not following commands, nonverbal (only moans to pain). Unable to obtain ROS due to mental status.    PAST MEDICAL & SURGICAL HISTORY:    FAMILY HISTORY:  Unknown     SOCIAL HISTORY:   Unknown       Allergies: NLDA    REVIEW OF SYSTEMS:  Unable to obtain ROS due ot mental status       Neuro:  fentaNYL    Injectable 25 MICROGram(s) IV Push Once    OTHER:  diphtheria/tetanus Vaccine - Adult 0.5 milliLiter(s) IntraMuscular once        Vital Signs Last 24 Hrs  T(C): --  T(F): --  HR: 89 (03 Jul 2021 22:00) (89 - 116)  BP: 141/67 (03 Jul 2021 22:00) (141/67 - 169/89)  BP(mean): 95 (03 Jul 2021 22:00) (95 - 95)  RR: 20 (03 Jul 2021 22:00) (18 - 20)  SpO2: 97% (03 Jul 2021 22:00) (97% - 97%)      PHYSICAL EXAM:  GENERAL: Awake, not alert, not following commands, spontaneously moving LUE/LLE  HEAD:  Atraumatic, normocephalic  EYES: Conjunctiva and sclera clear; Right pupil 3mm and L pupil 2mm minimally reactive   MENTAL STATUS: Awake, Opens eyes spontaneously, nonverbal- mild moaning to pain. not following command, Spontaneous nonpurposeful movement in LUE/LLE. No movement in RUE, RLE contracture   CHEST/LUNG: Respirations non-labored  ABDOMEN: Soft, nontenders  SKIN: Warm, dry      RADIOLOGY & ADDITIONAL STUDIES:  CT head from [facility] showing left parietotemporal hemorrhage      CAPRINI SCORE [CLOT]:  Patient has an estimated Caprini score of greater than 5.  However, the patient's unique clinical situation will be addressed in an individual manner to determine appropriate anticoagulation treatment, if any.

## 2021-07-03 NOTE — ED PROVIDER NOTE - NEUROLOGICAL, MLM
Awake, alert, +R sided facial droop, uncooperative with commands appear symm strength to arms and legs at this time unable to assess sensation or coordination.  eyes deviated to L

## 2021-07-03 NOTE — H&P ADULT - NSHPLABSRESULTS_GEN_ALL_CORE
11.5   11.32 )-----------( 202 ( 03 Jul 2021 23:40 )             36.1       07-03    141  |  107  |  33.1<H>  ----------------------------<  120<H>  5.2   |  21.0<L>  |  1.56<H>    Ca    8.8      03 Jul 2021 23:40    TPro  6.8  /  Alb  3.5  /  TBili  0.6  /  DBili  x   /  AST  27  /  ALT  21  /  AlkPhos  220<H>  07-03

## 2021-07-03 NOTE — H&P ADULT - NSHPSOCIALHISTORY_GEN_ALL_CORE
Resident at assisted living facility  No documented or reported hx of smoking, heavy EtOH use or illicit drug use

## 2021-07-03 NOTE — ED ADULT NURSE NOTE - CHIEF COMPLAINT QUOTE
trans from [facility] for neuro sx services.  fall at facility where pt resides, ?skull fx/head bleed found on ct scan. pt arrives non verbal agitated MD Alcantara at bedside for further evaluation.

## 2021-07-03 NOTE — H&P ADULT - TIME BILLING
chart review, GOC conversation with daughter, filling out MOLST form,  Plan discussed with ED RN, charge ED RN, pt daughter via phone and again in person.

## 2021-07-03 NOTE — ED PROVIDER NOTE - OBJECTIVE STATEMENT
89 y/o male  with a PMHx of CAD s/p CABG x4, CVA, HLD, Mitral regurgitation, Renal insufficiency, Prostate CA, Hypothyroidism, Dementia, PUD, GI bleed, panic attacks, presents to the ED BIBA s/p [facility] for difficulty speaking and R sided weakness, found to have left temproral hemorrhage. 89 y/o male  with a PMHx of CAD s/p CABG x4, CVA, HLD, Mitral regurgitation, Renal insufficiency, Prostate CA, Hypothyroidism, Dementia, PUD, GI bleed, panic attacks, with difficulty speaking and R sided weakness, found to have left temproral hemorrhage and transferred here to [facility] approved by NSx.  Patient is nonverbal and DNR.

## 2021-07-03 NOTE — ED ADULT TRIAGE NOTE - CHIEF COMPLAINT QUOTE
trans [facility].  fall at facility where pt resides, ?skull fx/head bleed found on ct scan. pt arrives non verbal agitated MD Alcantara at bedside for further evaluation.

## 2021-07-03 NOTE — ED ADULT NURSE NOTE - OBJECTIVE STATEMENT
Patient transferred from [place] ED for confirmed left temproral hemorrhage. Patient arrives VSS, unable to speak, agitated. Unable to answer assessment questions at this time. Bruising noted over bilateral arms and legs, minor skin tears to elbows and wrists.

## 2021-07-03 NOTE — H&P ADULT - HISTORY OF PRESENT ILLNESS
89yoM hx dementia, CAD s/p CABG, prior CVA, CKD, prostate CA, PUD, HTN, hypothyroidism who was transferred from [place] for CVA with hemorrhagic conversation.  Pt obtunded and unable to participate in interview so hx obtained from chart.  Per [facility] ED provider note (under alternate MRN [id number]), pt was sent from [facility] for aphasia with leftward gaze, R-sided facial droop and R-sided weakness that had an unclear onset.  Pt was reportedly in seen in his usual state of health earlier in the morning and found to be in the above state at some point in the evening.  There was no report of trauma at the facility prior to his symptoms.  At [facility], pt had CT head that showed 3.7 x 3.6 x 3.3 cm acute L-parietotemporal parenchymal hemorrhage with surrounding edema and 1.2 x 1.6 cm focus of acute parenchymal hemorrhage in L-insular region, with findings likely representing presence of an acute L-MCA territory infarct with hemorrhagic transformation.  Upon arrival at [facility] ED, pt was seen by NSGY who deemed him a poor surgical candidate and no neurosurgical interventions were recommended.  GOC discussion occurred at time of admission, pt DNR/DNI and made comfort measures. 89yoM hx dementia, CAD s/p CABG, prior CVA, CKD, prostate CA, PUD, HTN, hypothyroidism who was transferred from [place] for CVA with hemorrhagic conversation.  Pt obtunded and unable to participate in interview so hx obtained from chart.  Per [place] ED provider note (under alternate MRN [id number]), pt was sent from [facility] for aphasia with leftward gaze, R-sided facial droop and R-sided weakness that had an unclear onset.  Pt was reportedly in seen in his usual state of health earlier in the morning and found to be in the above state at some point in the evening.  There was no report of trauma at the facility prior to his symptoms.  At [facility], pt had CT head that showed 3.7 x 3.6 x 3.3 cm acute L-parietotemporal parenchymal hemorrhage with surrounding edema and 1.2 x 1.6 cm focus of acute parenchymal hemorrhage in L-insular region, with findings likely representing presence of an acute L-MCA territory infarct with hemorrhagic transformation.  Upon arrival at [facility] ED, pt was seen by NSGY who deemed him a poor surgical candidate and no neurosurgical interventions were recommended.  GOC discussion occurred at time of admission, pt DNR/DNI and made comfort measures only.

## 2021-07-03 NOTE — ED PROVIDER NOTE - ENMT, MLM
Airway patent, Nasal mucosa clear. MM dry. Throat has no vesicles, no oropharyngeal exudates and uvula is midline.

## 2021-07-03 NOTE — H&P ADULT - NSICDXPASTMEDICALHX_GEN_ALL_CORE_FT
PAST MEDICAL HISTORY:  CAD (coronary artery disease)     CVA (cerebrovascular accident)     Dementia     Hypertension     Hypothyroidism     Peptic ulcer disease     Prostate cancer

## 2021-07-03 NOTE — ED ADULT NURSE NOTE - NS ED NURSE RECORD ANOTHER VITAL SIGN
Goal Outcome Evaluation:  Plan of Care Reviewed With: patient  Progress: declining  Outcome Summary: NIH remains 0 but patient has had new onset febrile symptoms, fever of 102, frequent productive cough with increased oxygen demands requiring titration of O2. He is not compliant with fever reduction measures and will not allow me to keep ice packs on or remove his thick clothing/blankets. CXR and cultures performed overnight and reviewed by intensivist team. Sodium improved overnight and repeat pending this AM. Has repeatedly asked staff to bring him water/ice despite being heavily educated regarding the reasoning for his current restrictions. Also discussed at length his current pain control measures as he is requested pain meds about once an hour now, even though he has been sleeping without distress several times when I have brought them in. Refused bath yesterday and this shift, though does smell and is encouraged to be more hygienic. NIELS.   Yes

## 2021-07-03 NOTE — ED PROVIDER NOTE - NS_ ATTENDINGSCRIBEDETAILS _ED_A_ED_FT
Wellington London MD - The scribe's documentation has been prepared under my direction and personally reviewed by me in its entirety. I confirm that the note above accurately reflects all work, treatment, procedures, and medical decision making performed by me.

## 2021-07-03 NOTE — ED PROVIDER NOTE - OBJECTIVE STATEMENT
89 y/o male  with a PMHx of CAD s/p CABG x4, CVA, HLD, Mitral regurgitation, Renal insufficiency, Prostate CA, Hypothyroidism, Dementia, PUD, GI bleed, panic attacks, presents to the ED BIBA s/p [facility] for difficulty speaking and R sided weakness. Unknown onset of sx. Pt was last seen in usual state this morning, found this evening not speaking with leftward gaze and R sided facial droop and arm weakness. No sign of trauma. No reported fever or vomiting. Pt unable to contribute to hx further due pt with hx of dementia and is non verbal currently.

## 2021-07-03 NOTE — ED PROVIDER NOTE - PHYSICAL EXAMINATION
General: NAD, frail appearing  HEENT: Normocephalic, EOM intact, pupil 3mm and 2 mm rt and left, sluggish  Neck: No apparent stiffness or JVD  Pulm: Chest wall symmetric and nontender, lungs clear to ascultation   Cardiac: increased rate and regular rhythm  Abdomen: Nontender and nondistended  Skin: Diffuse bruising and skin tears  Neuro: see stroke scale  Psych: End stage dementia.

## 2021-07-03 NOTE — ED PROVIDER NOTE - CLINICAL SUMMARY MEDICAL DECISION MAKING FREE TEXT BOX
pt with aphasia, and R sided weakness of unknown onset. Will get CT and labs, pt will require admission for further assessment and w/u.

## 2021-07-03 NOTE — ED PROVIDER NOTE - ATTENDING CONTRIBUTION TO CARE
Documentation, interpretation of results, consultation with other physicians.    I personally saw the patient with the resident, and completed the key components of the history and physical exam. I then discussed the management plan with the resident.    89 y/o M with PMH CAD s/p CABG, CVA, HLD, MR, CRI, dementia presents as transfer from [facility] for left parietotemporal hemorrhage, accepted by NSx as ED to ED transfer. Patient has paperwork stating he is DNR/DNI. No blood pressure or pain control was given. Patient not on anticoagulations. Per [facility], patient was not a trauma as he did not fall today, as he is covered in bruises and skin tears. His initial NIH at [facility] was 25 with some difficulty due to patient's dementia and inability to follow commands.    AP - Cachectic, temporal wasting, dry mucous membranes, forced leftward gaze deviation, pupils unequal, left 2 mm and sluggish, right 3 mm and sluggish, right sided weakness, tachycardic, diminished breath sounds bilaterally, bilateral arms, chest, abdomen covered in ecchymoses of varying age and healing stages, skin tears bilateral lower legs, right elbow, left forearm, abd soft NT/ND. Patient unable to follow commands, NIH 20+.    I discussed with neurosurgery who recommends palliative admission to medicine as he is not a candidate for surgical intervention at this time.

## 2021-07-03 NOTE — ED PROVIDER NOTE - NIH STROKE SCALE: 6B. MOTOR LEG, RIGHT, QM
HPI:  54 year old M HTN and ETOH abuse brought in by wife for generalized weakness and dark urine.  Wife reports patient has been having non-bloody non-bilious vomiting last week which stopped his drinking on Friday 5/3.  Wife also notes he has been having easy bruising and L leg swelling over the same amount of time.  Upon further questioning, patient reports having increasing bruising because he works as a super, carrying stuff up and down stairs.  Wife subsequently added that he has been having weakness and fatigue since early March with episodes of gum bleeding going back to early January.  Of note, wife noted that his skin color has changed over the past week.  Pt denies fevers, chills, eye pain vision changes, (08 May 2019 23:01)  Concerned about long neglected fungal nails      PAST MEDICAL & SURGICAL HISTORY:  Benign essential HTN        MEDICATIONS  (STANDING):  buMETAnide 1 milliGRAM(s) Oral every 12 hours  chlordiazePOXIDE 10 milliGRAM(s) Oral three times a day  chlorhexidine 2% Cloths 1 Application(s) Topical daily  chlorhexidine 4% Liquid 1 Application(s) Topical <User Schedule>  folic acid 1 milliGRAM(s) Oral daily  lactulose Syrup 10 Gram(s) Oral two times a day  multivitamin 1 Tablet(s) Oral daily  pantoprazole    Tablet 40 milliGRAM(s) Oral before breakfast  piperacillin/tazobactam IVPB. 3.375 Gram(s) IV Intermittent every 8 hours  predniSONE   Tablet 20 milliGRAM(s) Oral three times a day  vancomycin  IVPB 1000 milliGRAM(s) IV Intermittent every 12 hours    MEDICATIONS  (PRN):  oxyCODONE    5 mG/acetaminophen 325 mG 1 Tablet(s) Oral every 4 hours PRN Moderate Pain (4 - 6)  oxyCODONE    5 mG/acetaminophen 325 mG 2 Tablet(s) Oral every 6 hours PRN Severe Pain (7 - 10)  polyethylene glycol 3350 17 Gram(s) Oral daily PRN Constipation      Allergies    No Known Allergies    Intolerances        SOCIAL HISTORY:Smoking history  Alcohol history  Drug history    FAMILY HISTORY:      Vital Signs Last 24 Hrs  T(C): 36.7 (20 May 2019 12:34), Max: 37.1 (19 May 2019 17:50)  T(F): 98 (20 May 2019 12:34), Max: 98.8 (19 May 2019 17:50)  HR: 73 (20 May 2019 12:34) (73 - 88)  BP: 101/71 (20 May 2019 12:34) (101/71 - 123/68)  BP(mean): --  RR: 17 (20 May 2019 12:34) (16 - 18)  SpO2: 99% (20 May 2019 12:34) (96% - 99%)    PHYSICAL EXAM:    GENERAL: NAD,lethargic  NERVOUS SYSTEM:  decreased sensorium  EXTREMITIES:  non palpable pulses +4 pitting edema both legs and feet  SKIN: No rashes or lesions  ORTHOPEDIC: foot deformities: rectus  ULCER : None  Nails 1-10 3mm thickness long and dystrophic                      8.6    9.18  )-----------( 198      ( 20 May 2019 05:46 )             26.7     05-20    142  |  107  |  35<H>  ----------------------------<  130<H>  3.8   |  27  |  1.26    Ca    8.7      20 May 2019 05:46    TPro  7.6  /  Alb  1.8<L>  /  TBili  6.5<H>  /  DBili  3.60<H>  /  AST  76<H>  /  ALT  23  /  AlkPhos  123<H>  05-20    PT/INR - ( 19 May 2019 06:41 )   PT: 19.8 sec;   INR: 1.74 ratio                   RADIOLOGY & ADDITIONAL STUDIES: (2) Some effort against gravity; leg falls to bed by 5 secs, but has some effort against gravity

## 2021-07-03 NOTE — ED PROVIDER NOTE - CLINICAL SUMMARY MEDICAL DECISION MAKING FREE TEXT BOX
89 y/o male  with a PMHx of CAD s/p CABG x4, CVA, HLD, Mitral regurgitation, Renal insufficiency, Prostate CA, Hypothyroidism, Dementia, PUD, GI bleed, panic attacks, with difficulty speaking and R sided weakness, found to have left temporal hemorrhage and transferred here to [facility] approved by NSx. Nsx says nothing can be done. Patient is nonverbal and DNR.  Admit with comfort measures only for palliative care consult next week.  Admit to medicine.

## 2021-07-03 NOTE — ED ADULT NURSE NOTE - NSSEPSISSUSPECTED_ED_A_ED
SUBJECTIVE:   Radha Christensen is a 35 year old female who presents to clinic today for the following health issues:    Presents for preventive screenings, history of ascus Pap with HPV +2014, Pap 2016 normal  Would like to complete cervical cancer screening today  Due for refill on oral contraceptives for dysmenorrhea and acne, has been tolerating well  Recently completed multiple labs which were normal      HPI    Patient Active Problem List   Diagnosis     Other abnormal Papanicolaou smear of cervix and cervical HPV(795.09)     Acne, mild     Alcohol abuse     Dysthymic disorder     Asthma     Cervical high risk HPV (human papillomavirus) test positive     Cheilitis     Concussion     Contraceptive management     Encounter for surveillance of contraceptives     Family history of diabetes mellitus (DM)     Migraine headache     Encounter for routine gynecological examination     Tobacco abuse     Vaginitis     History reviewed. No pertinent surgical history.    Social History   Substance Use Topics     Smoking status: Former Smoker     Types: Cigarettes     Quit date: 1/1/2013     Smokeless tobacco: Never Used     Alcohol use Yes      Comment: Alcoholic Drinks/day: Occ     Family History   Problem Relation Age of Onset     HEART DISEASE Maternal Grandfather 50     Heart Disease     Diabetes Maternal Grandfather      Diabetes     Chronic Obstructive Pulmonary Disease Maternal Grandfather      COPD     Diabetes Mother      Diabetes     Thyroid Disease Mother      Thyroid Disease     Thyroid Disease Brother      Thyroid Disease     Diabetes Brother 38     Diabetes     Diabetes Maternal Uncle      Diabetes     Breast Cancer No family hx of      Cancer-breast     Ovarian Cancer No family hx of      Cancer-ovarian     Prostate Cancer No family hx of      Cancer-prostate     Other - See Comments No family hx of      Stroke     Colon Cancer No family hx of      Cancer-colon     Blood Disease No family hx of      Blood  Disease         Current Outpatient Prescriptions   Medication Sig Dispense Refill     albuterol (PROAIR HFA/PROVENTIL HFA/VENTOLIN HFA) 108 (90 BASE) MCG/ACT Inhaler Inhale 2 puffs into the lungs every 4 hours as needed       escitalopram (LEXAPRO) 10 MG tablet TAKE 1 TABLET BY MOUTH EVERY DAY 90 tablet 3     minocycline (MINOCIN/DYNACIN) 100 MG capsule TAKE 1 CAPSULE BY MOUTH TWICE DAILY 120 capsule 3     norgestimate-ethinyl estradiol (PREVIFEM) 0.25-35 MG-MCG per tablet Take 1 tablet by mouth daily 84 tablet 5     polyethylene glycol (MIRALAX) powder Take 17 g (1 capful) by mouth daily 510 g 1     topiramate (TOPAMAX) 25 MG tablet TAKE 1 TABLET BY MOUTH EVERY DAY 90 tablet 3     LORazepam (ATIVAN) 0.5 MG tablet Take 1 tablet by mouth every 6 hours as needed       senna (SENOKOT) 8.6 MG tablet 2 tabs today and as needed up to once day for constaiption (Patient not taking: Reported on 10/18/2018) 120 tablet 0     Allergies   Allergen Reactions     Cefaclor Unknown     Recent Labs   Lab Test  09/11/18   1525  08/02/17   1642   A1C  4.7   --    ALT  12   --    CR  0.77  0.93   GFRESTIMATED  85   --    GFRESTBLACK  >90  >60   POTASSIUM  4.0  4.1      BP Readings from Last 3 Encounters:   10/18/18 120/88   09/11/18 128/82   05/23/18 122/74    Wt Readings from Last 3 Encounters:   10/18/18 224 lb (101.6 kg)   09/11/18 222 lb 3.2 oz (100.8 kg)   05/23/18 211 lb (95.7 kg)                  Labs reviewed in EPIC    Review of Systems   All other systems reviewed and are negative.       OBJECTIVE:     /88 (BP Location: Right arm, Patient Position: Sitting, Cuff Size: Adult Large)  Pulse 64  Temp 98.9  F (37.2  C) (Temporal)  Wt 224 lb (101.6 kg)  LMP 10/02/2018  Breastfeeding? No  BMI 35.08 kg/m2  Body mass index is 35.08 kg/(m^2).  Physical Exam   Constitutional: She is oriented to person, place, and time. She appears well-developed and well-nourished.   Cardiovascular: Normal rate.    Pulmonary/Chest: Effort  normal.   Genitourinary: No vaginal discharge found.   Genitourinary Comments: Pelvic exam negative for any visible lesions, bleeding, discharge  Pap obtained   Musculoskeletal: Normal range of motion.   Neurological: She is alert and oriented to person, place, and time.   Skin: Skin is warm and dry.   Psychiatric: She has a normal mood and affect. Her behavior is normal. Judgment and thought content normal.   Nursing note and vitals reviewed.          ASSESSMENT/PLAN:     Medication reviewed and refilled for a year  Seasonal flu vaccine updated    Follow-up pending final Pap results--- if completely normal next Pap 3-5 years    1. Encounter for surveillance of contraceptive pills    - norgestimate-ethinyl estradiol (PREVIFEM) 0.25-35 MG-MCG per tablet; Take 1 tablet by mouth daily  Dispense: 84 tablet; Refill: 5    2. Acne, mild    - norgestimate-ethinyl estradiol (PREVIFEM) 0.25-35 MG-MCG per tablet; Take 1 tablet by mouth daily  Dispense: 84 tablet; Refill: 5    3. Encounter for immunization    - [facility]-  HC FLU VAC PRESRV FREE QUAD SPLIT VIR 3+YRS IM    4. Screening for cervical cancer    - HPV High Risk Types DNA Cervical  - Pap Screen Thin Prep with HPV - recommended age 30 - 65 years (select HPV order below)    5. Need for prophylactic vaccination and inoculation against influenza          [facility] AND [facility]       No

## 2021-07-03 NOTE — H&P ADULT - NSHPPHYSICALEXAM_GEN_ALL_CORE
Vital Signs Last 24 Hrs  T(C): 37.9 (04 Jul 2021 04:26), Max: 37.9 (04 Jul 2021 04:26)  T(F): 100.3 (04 Jul 2021 04:26), Max: 100.3 (04 Jul 2021 04:26)  HR: 136 (04 Jul 2021 04:26) (89 - 136)  BP: 152/132 (04 Jul 2021 04:26) (141/67 - 169/89)  BP(mean): 95 (03 Jul 2021 22:00) (95 - 95)  RR: 20 (03 Jul 2021 22:00) (18 - 20)  SpO2: 87% (04 Jul 2021 04:26) (87% - 97%)    GENERAL:  Ill-appearing elderly male with leftward gaze  EYES:  Clear conjunctiva, leftward gaze   ENT: Dry mucous membranes  RESP:  Mild tachypnea, lungs clear to ausculation in anterior fields  CV: Tachycardia, no murmurs appreciated, no lower extremity edema  GI: Soft, non-distended  NEURO: Obtunded, unable to participate in neuro exam, does not withdraw to pain  PSYCH: Obtunded  SKIN: Extensive ecchymoses on arms, scattered abrasions on legs

## 2021-07-03 NOTE — H&P ADULT - CONVERSATION DETAILS
[facility] with daughter Guerline Newton who is also health care proxy.  Daughter made aware that pt had large ischemic stroke with intracranial hemorrhage and that he is not a neurosurgical candidate given his advanced age and comorbidities.  Explained that pt clinical status is deteriorating and in light of the intracranial hemorrhage, pt is likely actively dying and that we can focus on end of life care with making pt comfortable.  Daughter verbalized understanding, stated that ‘her dad is 96 years old and she’s aware that this has been coming’.  She stated that pt has been DNR/DNI for several years (which is reflected in transfer paperwork).     Daughter amenable to full comfort measures, stated she does not want any aggressive interventions such as vasopressors and also does not want further blood draws, diagnostic testing, monitors or checking of vital signs.      Offered wife to come to the hospital to see pt now. Wife requested for a  which ED RN Adelaide Prasad called and he stated will be here in the morning. Wife eventually arrived to bedside with her .      Pt DNR/DNI, full comfort measures. MOLST form filled out by me and scanned into alpha. Morphine PRN, Ativan PRN and robinul PRN to be ordered. [facility] with daughter Guerline Newton who is also health care proxy.  Daughter made aware that pt had large ischemic stroke with intracranial hemorrhage and that he is not a neurosurgical candidate given his advanced age and comorbidities.  Explained that pt clinical status is deteriorating and in light of the intracranial hemorrhage, pt is likely actively dying and that we can focus on end of life care with making pt comfortable.  Daughter verbalized understanding, stated that ‘her dad is 96 years old and she’s aware that this has been coming’.  She stated that pt has been DNR/DNI for several years (which is reflected in transfer paperwork).     Daughter amenable to full comfort measures, stated she does not want any aggressive interventions such as vasopressors and also does not want further blood draws, diagnostic testing, monitors or checking of vital signs.      Offered daughter to come to the hospital to see pt now.  She requested for a  which ED RN Adelaide Prasad called and he stated will be here in the morning. Daughter eventually arrived to bedside with her .      Pt DNR/DNI, full comfort measures. MOLST form filled out by me and scanned into alpha. Morphine PRN, Ativan PRN and robinul PRN to be ordered.

## 2021-07-04 VITALS
OXYGEN SATURATION: 87 % | DIASTOLIC BLOOD PRESSURE: 132 MMHG | SYSTOLIC BLOOD PRESSURE: 152 MMHG | HEART RATE: 136 BPM | TEMPERATURE: 100 F

## 2021-07-04 DIAGNOSIS — Z95.1 PRESENCE OF AORTOCORONARY BYPASS GRAFT: Chronic | ICD-10-CM

## 2021-07-04 LAB
ALBUMIN SERPL ELPH-MCNC: 3.5 G/DL — SIGNIFICANT CHANGE UP (ref 3.3–5.2)
ALP SERPL-CCNC: 220 U/L — HIGH (ref 40–120)
ALT FLD-CCNC: 21 U/L — SIGNIFICANT CHANGE UP
ANION GAP SERPL CALC-SCNC: 13 MMOL/L — SIGNIFICANT CHANGE UP (ref 5–17)
AST SERPL-CCNC: 27 U/L — SIGNIFICANT CHANGE UP
BASOPHILS # BLD AUTO: 0.03 K/UL — SIGNIFICANT CHANGE UP (ref 0–0.2)
BASOPHILS NFR BLD AUTO: 0.3 % — SIGNIFICANT CHANGE UP (ref 0–2)
BILIRUB SERPL-MCNC: 0.6 MG/DL — SIGNIFICANT CHANGE UP (ref 0.4–2)
BUN SERPL-MCNC: 33.1 MG/DL — HIGH (ref 8–20)
CALCIUM SERPL-MCNC: 8.8 MG/DL — SIGNIFICANT CHANGE UP (ref 8.6–10.2)
CHLORIDE SERPL-SCNC: 107 MMOL/L — SIGNIFICANT CHANGE UP (ref 98–107)
CO2 SERPL-SCNC: 21 MMOL/L — LOW (ref 22–29)
CREAT SERPL-MCNC: 1.56 MG/DL — HIGH (ref 0.5–1.3)
EOSINOPHIL # BLD AUTO: 0 K/UL — SIGNIFICANT CHANGE UP (ref 0–0.5)
EOSINOPHIL NFR BLD AUTO: 0 % — SIGNIFICANT CHANGE UP (ref 0–6)
GLUCOSE SERPL-MCNC: 120 MG/DL — HIGH (ref 70–99)
IMM GRANULOCYTES NFR BLD AUTO: 1 % — SIGNIFICANT CHANGE UP (ref 0–1.5)
LYMPHOCYTES # BLD AUTO: 0.45 K/UL — LOW (ref 1–3.3)
LYMPHOCYTES # BLD AUTO: 4 % — LOW (ref 13–44)
MONOCYTES # BLD AUTO: 1.13 K/UL — HIGH (ref 0–0.9)
MONOCYTES NFR BLD AUTO: 10 % — SIGNIFICANT CHANGE UP (ref 2–14)
NEUTROPHILS # BLD AUTO: 9.6 K/UL — HIGH (ref 1.8–7.4)
NEUTROPHILS NFR BLD AUTO: 84.7 % — HIGH (ref 43–77)
POTASSIUM SERPL-MCNC: 5.2 MMOL/L — SIGNIFICANT CHANGE UP (ref 3.5–5.3)
POTASSIUM SERPL-SCNC: 5.2 MMOL/L — SIGNIFICANT CHANGE UP (ref 3.5–5.3)
PROT SERPL-MCNC: 6.8 G/DL — SIGNIFICANT CHANGE UP (ref 6.6–8.7)
SARS-COV-2 RNA SPEC QL NAA+PROBE: SIGNIFICANT CHANGE UP
SODIUM SERPL-SCNC: 141 MMOL/L — SIGNIFICANT CHANGE UP (ref 135–145)

## 2021-07-04 PROCEDURE — 99497 ADVNCD CARE PLAN 30 MIN: CPT | Mod: 25

## 2021-07-04 PROCEDURE — 99223 1ST HOSP IP/OBS HIGH 75: CPT

## 2021-07-04 PROCEDURE — 99232 SBSQ HOSP IP/OBS MODERATE 35: CPT

## 2021-07-04 RX ORDER — ROBINUL 0.2 MG/ML
0.2 INJECTION INTRAMUSCULAR; INTRAVENOUS EVERY 4 HOURS
Refills: 0 | Status: DISCONTINUED | OUTPATIENT
Start: 2021-07-04 | End: 2021-07-05

## 2021-07-04 RX ORDER — HYDROMORPHONE HYDROCHLORIDE 2 MG/ML
0.5 INJECTION INTRAMUSCULAR; INTRAVENOUS; SUBCUTANEOUS
Refills: 0 | Status: DISCONTINUED | OUTPATIENT
Start: 2021-07-04 | End: 2021-07-05

## 2021-07-04 RX ORDER — MORPHINE SULFATE 50 MG/1
2 CAPSULE, EXTENDED RELEASE ORAL
Refills: 0 | Status: DISCONTINUED | OUTPATIENT
Start: 2021-07-04 | End: 2021-07-04

## 2021-07-04 RX ADMIN — Medication 1 MILLIGRAM(S): at 13:01

## 2021-07-04 RX ADMIN — Medication 1 MILLIGRAM(S): at 01:34

## 2021-07-04 RX ADMIN — Medication 1 MILLIGRAM(S): at 08:47

## 2021-07-04 RX ADMIN — MORPHINE SULFATE 2 MILLIGRAM(S): 50 CAPSULE, EXTENDED RELEASE ORAL at 00:33

## 2021-07-04 RX ADMIN — Medication 1 MILLIGRAM(S): at 21:56

## 2021-07-04 NOTE — CONSULT NOTE ADULT - ASSESSMENT
89yo M with hemorrhagic CVA. Now on comfort measures    -------------------------  END OF LIFE CARE RECOMMENDATIONS:  - Comfort care measures only  - Discontinue all lab tests  - Discontinue all monitors and alarms  - HOSPICE REFERRAL    # ENCEPHALOPATHY/ AGITATION/ TERMINAL DELIRIUM  - Pls do not give sedatives to hypoactive delirium or non-distressing agitation  - Ativan 1mg IV q4h PRN  - Continue clear communication as you are with patient and family    # RESPIRATORY FAILURE/ DYSPNEA/ SHORTNESS OF BREATH  - Change morphine to Dilaudid 0.5mg IV q2h PRN  - Can supplement O2 via NC for comfort or place a bedside fan to the face    # INCREASED OROPHARYNGEAL SECRETIONS/ CONGESTION OF UPPER AIRWAY/ TERMINAL SECRETIONS  - Glycopyrrolate 0.2mg IV/ SQ q4h PRN  - Can also consider a Scopolamine patch, although watch out for delirium    # PAIN  - Opioids as above    # NAUSEA  - Consider: Zofran 4mg IV q4h PRN  - Can also consider: Haldol 0.5mg or Ativan 0.5mg IV/IM/SQ/PO q4h PRN for nausea    # FEVERS  - Consider: Acetaminophen 650mg PO/ IL q6h PRN  - Can use cooling blankets as well    # CONSTIPATION  - Will defer constipation management for comfort    # FUNCTIONAL QUADRIPLEGIA  - Pls assist with ADL's  - Skin care as per protocol  
uncontrolled dm2
89M with left temporal hemorrhagic stroke     Plan:  - Patient is a poor surgical candidate, no neurosurgical interventions recommended  - Admit to medicine for medical management of stroke   - Discussed with / Jerry

## 2021-07-04 NOTE — PROGRESS NOTE ADULT - ASSESSMENT
89M with left temporal hemorrhagic stroke   DNR/ nonsurgical candidate     Plan:  - no neurosurgical interventions recommended  - palliative / hospice evewa   - pt seen w / Jerry   - reconsult as needed

## 2021-07-04 NOTE — CONSULT NOTE ADULT - SUBJECTIVE AND OBJECTIVE BOX
===========================================  SUMMARY OF RECOMMENDATIONS    - Change morphine to dilaudid in setting of renal failure  - Hospice referral    Akhil Leo Pls contact me via [employer] Teams today for any q's or c's about the patient!    ============================================      CC:     HPI:  89yoM hx dementia, CAD s/p CABG, prior CVA, CKD, prostate CA, PUD, HTN, hypothyroidism who was transferred from [facility] for CVA with hemorrhagic conversation.  Pt obtunded and unable to participate in interview so hx obtained from chart.  Per [facility] ED provider note (under alternate MRN [id number]), pt was sent from [facility] assisted living [facility] for aphasia with leftward gaze, R-sided facial droop and R-sided weakness that had an unclear onset.  Pt was reportedly in seen in his usual state of health earlier in the morning and found to be in the above state at some point in the evening.  There was no report of trauma at the facility prior to his symptoms.  At [facility], pt had CT head that showed 3.7 x 3.6 x 3.3 cm acute L-parietotemporal parenchymal hemorrhage with surrounding edema and 1.2 x 1.6 cm focus of acute parenchymal hemorrhage in L-insular region, with findings likely representing presence of an acute L-MCA territory infarct with hemorrhagic transformation.  Upon arrival at [facility] ED, pt was seen by NSGY who deemed him a poor surgical candidate and no neurosurgical interventions were recommended.  GOC discussion occurred at time of admission, pt DNR/DNI and made comfort measures only. (03 Jul 2021 23:35)      =======================================================  7/4/21    - Chart reviewed. Patient seen and examined. Unable to interact with me  - Reached out to daughter, Guerline    1) She understands the patient's grave condition. Estimated prognosis given: days  2) She re-affirmed DNR, DNI and comfort measures  3) Agrees with hospice. Requesting for inpatient hospice near [place]  4) All q's and c's addressed     =======================================================  ----- SYMPTOM ASSESSMENT:   [ X]Unable to obtain due to poor mentation: information obtained from team/ contact    PAIN ASSESSMENT:   Site-   Onset-   Character-   Radiation- NONE  Associated symptoms-   Timing and duration-   Exacerbating factors  Severity-     Effect on QOL- SIGNIFICANTLY INTERFERES WITH ADL'S  PAIN AD Score: 0    Dyspnea:  Yes [ ] No [X ] - [ ]Mild [ ]Moderate [ ]Severe  Anxiety:    Yes [ ] No [ X] - [ ]Mild [ ]Moderate [ ]Severe  Fatigue:    Yes [ ] No [X ] - [ ]Mild [ ]Moderate [ ]Severe  Nausea:    Yes [ ] No [ X] - [ ]Mild [ ]Moderate [ ]Severe                         Loss of appetite: Yes [ ] No [X ] - [ ]Mild [ ]Moderate [ ]Severe             Constipation:  Yes [ ] No [X ] - [ ]Mild [ ]Moderate [ ]Severe  Grief: Yes [ ] No [X ]     [X ]All other review of systems negative, including: weakness, cough, colds, blurry vision, headaches, dysuria, pruritus, palpitations, muscle cramps, easy bruising, epistaxis, rashes    =======================================================  ----- PERTINENT PMH/ SXH/ FHX  Dementia    CAD (coronary artery disease)    Hypertension    CVA (cerebrovascular accident)    Peptic ulcer disease    Hypothyroidism    Prostate cancer      S/P CABG (coronary artery bypass graft)      FAMILY HISTORY:  No pertinent family history in first degree relatives        ----- SOCIAL HISTORY:   [ ] Unable to elicit  Significant other/partner:    Children:   Gnosticism/Spirituality:  Substance hx: Yes[ ]  No [ ]   Tobacco hx:  Yes [ ] No [ ]   Alcohol hx: Yes [ ] No [ ]   Home Opioid hx:  [ ] I-Stop Reference No:  Living Situation: [ ]Home  [ ]Long term care  [ ]Rehab [ ]Other    ----- ADVANCE DIRECTIVES:    DNR  Yes    MOLST  [ ]  Living Will  [ ]   DECISION MAKER(s):  [ ] Health Care Proxy(s)  [ ] Surrogate(s)  [ ] Guardian           Name(s): Phone Number(s):    ----- BASELINE (I)ADL(s) (prior to admission):  [place]: [ ]Total  [ ] Moderate [ ]Dependent  Palliative Performance Status Version 2:        =======================================================  -----MEDICATIONS AND ALLERGIES:  Allergies    No Known Allergies    Intolerances    MEDICATIONS  (STANDING):    MEDICATIONS  (PRN):  glycopyrrolate Injectable 0.2 milliGRAM(s) IV Push every 4 hours PRN Secretiobs  LORazepam   Injectable 1 milliGRAM(s) IV Push every 4 hours PRN Signs of Anxiety  morphine  - Injectable 2 milliGRAM(s) IV Push every 2 hours PRN Signs of pain or dsypnea      =======================================================  ----- PHYSICAL EXAM:  Vital Signs Last 24 Hrs  T(C): 37.9 (04 Jul 2021 04:26), Max: 37.9 (04 Jul 2021 04:26)  T(F): 100.3 (04 Jul 2021 04:26), Max: 100.3 (04 Jul 2021 04:26)  HR: 136 (04 Jul 2021 04:26) (89 - 136)  BP: 152/132 (04 Jul 2021 04:26) (141/67 - 169/89)  BP(mean): 95 (03 Jul 2021 22:00) (95 - 95)  RR: 20 (03 Jul 2021 22:00) (18 - 20)  SpO2: 87% (04 Jul 2021 04:26) (87% - 97%) I&O's Summary      GEN: OBTUNDED, NAD  HEAD: Normocephalic and atraumatic,   EYES: Anicteric sclerae, EOM's grossly intact  NECK: No JVD, no thyromegaly  PULM: Comfortable work of breathing, clear BS  CV: Pulses 2+ symmetric bilaterally, regular rate and rhythm  ABD: Not distended, soft, non-tender,   EXT: No edema, No deformities  PSYCH: UNABLE TO ASSESS  NEURO: No facial asymmetry, no tremors, no observed movement disorders  SKIN: No rashes or lesions on exposed skin, No jaundice    =======================================================  ----- LABS:                        11.5   11.32 )-----------( 202      ( 03 Jul 2021 23:40 )             36.1   07-03    141  |  107  |  33.1<H>  ----------------------------<  120<H>  5.2   |  21.0<L>  |  1.56<H>    Ca    8.8      03 Jul 2021 23:40    TPro  6.8  /  Alb  3.5  /  TBili  0.6  /  DBili  x   /  AST  27  /  ALT  21  /  AlkPhos  220<H>  07-03  PT/INR - ( 03 Jul 2021 23:40 )   PT: 13.1 sec;   INR: 1.14 ratio         PTT - ( 03 Jul 2021 23:40 )  PTT:29.6 sec      -----RADIOLOGY & ADDITIONAL STUDIES:    PROTEIN CALORIE MALNUTRITION PRESENT: [ ] Yes [ ] No  [ ] PPSV2 < or = to 30% [ ] significant weight loss  [ ] poor nutritional intake [ ] catabolic state [ ] anasarca     Albumin, Serum: 3.5 g/dL (07-03-21 @ 23:40)      REFERRALS:   [ ]Chaplaincy  [ ] Hospice  [ ]Child Life  [ ]Social Work  [ ]Case management [ ]Holistic Therapy   Goals of Care Discussion Document:

## 2021-07-05 LAB
COVID-19 SPIKE DOMAIN AB INTERP: POSITIVE
COVID-19 SPIKE DOMAIN ANTIBODY RESULT: 49.7 U/ML — HIGH
SARS-COV-2 IGG+IGM SERPL QL IA: 49.7 U/ML — HIGH
SARS-COV-2 IGG+IGM SERPL QL IA: POSITIVE

## 2021-07-05 PROCEDURE — 85730 THROMBOPLASTIN TIME PARTIAL: CPT

## 2021-07-05 PROCEDURE — U0003: CPT

## 2021-07-05 PROCEDURE — 99239 HOSP IP/OBS DSCHRG MGMT >30: CPT

## 2021-07-05 PROCEDURE — 86900 BLOOD TYPING SEROLOGIC ABO: CPT

## 2021-07-05 PROCEDURE — 80053 COMPREHEN METABOLIC PANEL: CPT

## 2021-07-05 PROCEDURE — 86901 BLOOD TYPING SEROLOGIC RH(D): CPT

## 2021-07-05 PROCEDURE — 85025 COMPLETE CBC W/AUTO DIFF WBC: CPT

## 2021-07-05 PROCEDURE — 85610 PROTHROMBIN TIME: CPT

## 2021-07-05 PROCEDURE — 36415 COLL VENOUS BLD VENIPUNCTURE: CPT

## 2021-07-05 PROCEDURE — U0005: CPT

## 2021-07-05 PROCEDURE — 99233 SBSQ HOSP IP/OBS HIGH 50: CPT

## 2021-07-05 PROCEDURE — 86769 SARS-COV-2 COVID-19 ANTIBODY: CPT

## 2021-07-05 PROCEDURE — 90714 TD VACC NO PRESV 7 YRS+ IM: CPT

## 2021-07-05 PROCEDURE — 86850 RBC ANTIBODY SCREEN: CPT

## 2021-07-05 RX ORDER — HYDROMORPHONE HYDROCHLORIDE 2 MG/ML
1 INJECTION INTRAMUSCULAR; INTRAVENOUS; SUBCUTANEOUS
Refills: 0 | Status: DISCONTINUED | OUTPATIENT
Start: 2021-07-05 | End: 2021-07-05

## 2021-07-05 RX ORDER — HYDROMORPHONE HYDROCHLORIDE 2 MG/ML
0.5 INJECTION INTRAMUSCULAR; INTRAVENOUS; SUBCUTANEOUS
Qty: 0 | Refills: 0 | DISCHARGE
Start: 2021-07-05

## 2021-07-05 RX ORDER — ROBINUL 0.2 MG/ML
0.2 INJECTION INTRAMUSCULAR; INTRAVENOUS
Qty: 0 | Refills: 0 | DISCHARGE
Start: 2021-07-05

## 2021-07-05 RX ORDER — HYDROMORPHONE HYDROCHLORIDE 2 MG/ML
0.5 INJECTION INTRAMUSCULAR; INTRAVENOUS; SUBCUTANEOUS EVERY 4 HOURS
Refills: 0 | Status: DISCONTINUED | OUTPATIENT
Start: 2021-07-05 | End: 2021-07-05

## 2021-07-05 RX ADMIN — Medication 1 MILLIGRAM(S): at 11:26

## 2021-07-05 RX ADMIN — HYDROMORPHONE HYDROCHLORIDE 0.5 MILLIGRAM(S): 2 INJECTION INTRAMUSCULAR; INTRAVENOUS; SUBCUTANEOUS at 09:37

## 2021-07-05 RX ADMIN — HYDROMORPHONE HYDROCHLORIDE 0.5 MILLIGRAM(S): 2 INJECTION INTRAMUSCULAR; INTRAVENOUS; SUBCUTANEOUS at 09:00

## 2021-07-05 NOTE — PROGRESS NOTE ADULT - SUBJECTIVE AND OBJECTIVE BOX
NEUROSURGERY PROGRESS NOTE:    89 y/o male with a PMHx of CAD s/p CABG x4, CVA, HLD, Mitral regurgitation, Renal insufficiency, Prostate CA, Hypothyroidism, Dementia, PUD, GI bleed, panic attacks, who was transferred from [facility] for left temporal intraparenchymal hemorrhage. Patient was brought to [place] from [facility] for difficulty speaking and R sided weakness. Patient is currently awake, not following commands, nonverbal (only moans to pain). Unable to obtain ROS due to mental status.    pt seen w Dr Edwards, no changes reported   palliative eval pending/ possible hospice       MEDICATIONS  (STANDING):    MEDICATIONS  (PRN):  glycopyrrolate Injectable 0.2 milliGRAM(s) IV Push every 4 hours PRN Secretiobs  HYDROmorphone  Injectable 0.5 milliGRAM(s) IV Push every 2 hours PRN pain or SOB  LORazepam   Injectable 1 milliGRAM(s) IV Push every 4 hours PRN Signs of Anxiety    Allergies    No Known Allergies    Intolerances      Vital Signs Last 24 Hrs  T(C): 37.9 (04 Jul 2021 04:26), Max: 37.9 (04 Jul 2021 04:26)  T(F): 100.3 (04 Jul 2021 04:26), Max: 100.3 (04 Jul 2021 04:26)  HR: 136 (04 Jul 2021 04:26) (89 - 136)  BP: 152/132 (04 Jul 2021 04:26) (141/67 - 169/89)  BP(mean): 95 (03 Jul 2021 22:00) (95 - 95)  RR: 20 (03 Jul 2021 22:00) (18 - 20)  SpO2: 87% (04 Jul 2021 04:26) (87% - 97%)          PHYSICAL EXAM:  GENERAL: eyes opened in bed, ?awake, not alert, not following commands, spontaneously minimally moving LUE/LLE  HEAD:  Atraumatic, normocephalic  EYES: Conjunctiva and sclera clear; Right pupil 3mm and L pupil 2mm minimally reactive   MENTAL STATUS: Opens eyes spontaneously, nonverbal- mild moaning to pain. not following command, Spontaneous nonpurposeful movement in LUE/LLE. No movement in RUE, RLE contracture   CHEST/LUNG: Respirations non-labored/ shallow       LABS:                        11.5   11.32 )-----------( 202      ( 03 Jul 2021 23:40 )             36.1     07-03    141  |  107  |  33.1<H>  ----------------------------<  120<H>  5.2   |  21.0<L>  |  1.56<H>    Ca    8.8      03 Jul 2021 23:40    TPro  6.8  /  Alb  3.5  /  TBili  0.6  /  DBili  x   /  AST  27  /  ALT  21  /  AlkPhos  220<H>  07-03    PT/INR - ( 03 Jul 2021 23:40 )   PT: 13.1 sec;   INR: 1.14 ratio    PTT - ( 03 Jul 2021 23:40 )  PTT:29.6 sec        RADIOLOGY & ADDITIONAL TESTS:  no new NSx images available for review         Time Spent with patient/ education: > 2 years 
SATYA PEREZ  ----------------------------------------  The patient was seen at bedside. Patient with stoke and hemorrhagic conversion. Lethargic on examination.    Vital Signs Last 24 Hrs  T(C): 37.9 (04 Jul 2021 04:26), Max: 37.9 (04 Jul 2021 04:26)  T(F): 100.3 (04 Jul 2021 04:26), Max: 100.3 (04 Jul 2021 04:26)  HR: 136 (04 Jul 2021 04:26) (89 - 136)  BP: 152/132 (04 Jul 2021 04:26) (141/67 - 169/89)  BP(mean): 95 (03 Jul 2021 22:00) (95 - 95)  RR: 20 (03 Jul 2021 22:00) (18 - 20)  SpO2: 87% (04 Jul 2021 04:26) (87% - 97%)    PHYSICAL EXAMINATION:  ----------------------------------------  General appearance: No acute distress, Lethargic  HEENT: Normocephalic, Atraumatic, Conjunctiva clear  Neck: Supple, No JVD  Lungs: Clear to auscultation, Breath sound equal bilaterally  Cardiovascular: S1S2, Regular rhythm  Abdomen: Soft, Nondistended, Positive bowel sounds  Extremities: No clubbing, No cyanosis, No edema, Areas of echymosis  Neurological: Limited by patient participation, Moves extremities with physical stimuli but less in the right upper extremity    LABORATORY STUDIES:  ----------------------------------------             11.5   11.32 )-----------( 202      ( 03 Jul 2021 23:40 )             36.1     07-03    141  |  107  |  33.1<H>  ----------------------------<  120<H>  5.2   |  21.0<L>  |  1.56<H>    Ca    8.8      03 Jul 2021 23:40    TPro  6.8  /  Alb  3.5  /  TBili  0.6  /  DBili  x   /  AST  27  /  ALT  21  /  AlkPhos  220<H>  07-03    LIVER FUNCTIONS - ( 03 Jul 2021 23:40 )  Alb: 3.5 g/dL / Pro: 6.8 g/dL / ALK PHOS: 220 U/L / ALT: 21 U/L / AST: 27 U/L / GGT: x           PT/INR - ( 03 Jul 2021 23:40 )   PT: 13.1 sec;   INR: 1.14 ratio    PTT - ( 03 Jul 2021 23:40 )  PTT:29.6 sec    MEDICATIONS  (STANDING):    MEDICATIONS  (PRN):  glycopyrrolate Injectable 0.2 milliGRAM(s) IV Push every 4 hours PRN Secretiobs  LORazepam   Injectable 1 milliGRAM(s) IV Push every 4 hours PRN Signs of Anxiety  morphine  - Injectable 2 milliGRAM(s) IV Push every 2 hours PRN Signs of pain or dsypnea      ASSESSMENT / PLAN:  ----------------------------------------  89M with a history of dementia, coronary artery disease with bypass surgery, chronic kidney disease, peptic ulcer disease, and hypothyroidism, who initially presented to [facility] with difficulty speaking and right sided weakness who was transferred to [facility] for further management.    Stroke with hemorrhagic conversion - CT results noted. Neurosurgery consultation noted without plans for intervention. Discussion with the family noted with wishes to pursue comfort care measures. On lorazepam and morphine as needed. Palliative Care consulted.    Coronary artery disease - Comfort care measures in progress.    Chronic kidney disease - Initial laboratory studies were without hyperkalemia. No further blood draws as per family wishes.    Overall prognosis grave. DNR/DNI orders in place.
Overnight events: patient appears labored in breathing. now on comfort measures. received multiple PRN doses of IV dilaudid.       =======================================================  ----- SYMPTOM ASSESSMENT:   [ X]Unable to obtain due to poor mentation: information obtained from team/ contact    PAIN ASSESSMENT:   Site-   Onset-   Character-   Radiation- NONE  Associated symptoms-   Timing and duration-   Exacerbating factors  Severity-     Effect on QOL- SIGNIFICANTLY INTERFERES WITH ADL'S  PAIN AD Score: 0    Dyspnea:  Yes [ ] No [X ] - [ ]Mild [ ]Moderate [ ]Severe  Anxiety:    Yes [ ] No [ X] - [ ]Mild [ ]Moderate [ ]Severe  Fatigue:    Yes [ ] No [X ] - [ ]Mild [ ]Moderate [ ]Severe  Nausea:    Yes [ ] No [ X] - [ ]Mild [ ]Moderate [ ]Severe                         Loss of appetite: Yes [ ] No [X ] - [ ]Mild [ ]Moderate [ ]Severe             Constipation:  Yes [ ] No [X ] - [ ]Mild [ ]Moderate [ ]Severe  Grief: Yes [ ] No [X ]     [X ]All other review of systems negative, including: weakness, cough, colds, blurry vision, headaches, dysuria, pruritus, palpitations, muscle cramps, easy bruising, epistaxis, rashes    =======================================================  ----- PERTINENT PMH/ SXH/ FHX  Dementia    CAD (coronary artery disease)    Hypertension    CVA (cerebrovascular accident)    Peptic ulcer disease    Hypothyroidism    Prostate cancer      S/P CABG (coronary artery bypass graft)      FAMILY HISTORY:  No pertinent family history in first degree relatives        ----- SOCIAL HISTORY:   [ ] Unable to elicit  Significant other/partner:    Children:   Zoroastrianism/Spirituality:  Substance hx: Yes[ ]  No [ ]   Tobacco hx:  Yes [ ] No [ ]   Alcohol hx: Yes [ ] No [ ]   Home Opioid hx:  [ ] I-Stop Reference No:  Living Situation: [ ]Home  [ ]Long term care  [ ]Rehab [ ]Other    ----- ADVANCE DIRECTIVES:    DNR  Yes    MOLST  [ ]  Living Will  [ ]   DECISION MAKER(s):  [ ] Health Care Proxy(s)  [ ] Surrogate(s)  [ ] Guardian           Name(s): Phone Number(s):    ----- BASELINE (I)ADL(s) (prior to admission):  [place]: [ ]Total  [ ] Moderate [ ]Dependent  Palliative Performance Status Version 2:        =======================================================  -----MEDICATIONS AND ALLERGIES:  Allergies    No Known Allergies    Intolerances    MEDICATIONS  (STANDING):    MEDICATIONS  (PRN):  glycopyrrolate Injectable 0.2 milliGRAM(s) IV Push every 4 hours PRN Secretiobs  LORazepam   Injectable 1 milliGRAM(s) IV Push every 4 hours PRN Signs of Anxiety  morphine  - Injectable 2 milliGRAM(s) IV Push every 2 hours PRN Signs of pain or dsypnea      =======================================================  ----- PHYSICAL EXAM:  Vital Signs Last 24 Hrs  T(C): 37.9 (04 Jul 2021 04:26), Max: 37.9 (04 Jul 2021 04:26)  T(F): 100.3 (04 Jul 2021 04:26), Max: 100.3 (04 Jul 2021 04:26)  HR: 136 (04 Jul 2021 04:26) (89 - 136)  BP: 152/132 (04 Jul 2021 04:26) (141/67 - 169/89)  BP(mean): 95 (03 Jul 2021 22:00) (95 - 95)  RR: 20 (03 Jul 2021 22:00) (18 - 20)  SpO2: 87% (04 Jul 2021 04:26) (87% - 97%) I&O's Summary      GEN: OBTUNDED, NAD  HEAD: Normocephalic and atraumatic,   EYES: Anicteric sclerae, EOM's grossly intact  NECK: No JVD, no thyromegaly  PULM: Comfortable work of breathing, clear BS  CV: Pulses 2+ symmetric bilaterally, regular rate and rhythm  ABD: Not distended, soft, non-tender,   EXT: No edema, No deformities  PSYCH: UNABLE TO ASSESS  NEURO: No facial asymmetry, no tremors, no observed movement disorders  SKIN: No rashes or lesions on exposed skin, No jaundice    =======================================================  no new labs    -----RADIOLOGY & ADDITIONAL STUDIES:    PROTEIN CALORIE MALNUTRITION PRESENT: [ ] Yes [ ] No  [ ] PPSV2 < or = to 30% [ ] significant weight loss  [ ] poor nutritional intake [ ] catabolic state [ ] anasarca     Albumin, Serum: 3.5 g/dL (07-03-21 @ 23:40)      
SATYA PEREZ  ----------------------------------------  The patient was seen at bedside. Patient with hemorrhagic conversion. Lethargic.    Vital Signs Last 24 Hrs  T(C): --  T(F): --  HR: --  BP: --  BP(mean): --  RR: --  SpO2: --    PHYSICAL EXAMINATION:  ----------------------------------------  General appearance: No acute distress, Lethargic  HEENT: Normocephalic, Atraumatic, Conjunctiva clear  Neck: Supple, No JVD  Lungs: Clear to auscultation, Breath sound equal bilaterally  Cardiovascular: S1S2, Regular rhythm  Abdomen: Soft, Nondistended, Positive bowel sounds  Extremities: No clubbing, No cyanosis, No edema, Areas of ecchymosis    LABORATORY STUDIES:  ----------------------------------------             11.5   11.32 )-----------( 202      ( 03 Jul 2021 23:40 )             36.1     07-03    141  |  107  |  33.1<H>  ----------------------------<  120<H>  5.2   |  21.0<L>  |  1.56<H>    Ca    8.8      03 Jul 2021 23:40    TPro  6.8  /  Alb  3.5  /  TBili  0.6  /  DBili  x   /  AST  27  /  ALT  21  /  AlkPhos  220<H>  07-03    LIVER FUNCTIONS - ( 03 Jul 2021 23:40 )  Alb: 3.5 g/dL / Pro: 6.8 g/dL / ALK PHOS: 220 U/L / ALT: 21 U/L / AST: 27 U/L / GGT: x           PT/INR - ( 03 Jul 2021 23:40 )   PT: 13.1 sec;   INR: 1.14 ratio    PTT - ( 03 Jul 2021 23:40 )  PTT:29.6 sec    MEDICATIONS  (STANDING):  HYDROmorphone  Injectable 0.5 milliGRAM(s) IV Push every 4 hours  LORazepam   Injectable 1 milliGRAM(s) IV Push every 6 hours    MEDICATIONS  (PRN):  glycopyrrolate Injectable 0.2 milliGRAM(s) IV Push every 4 hours PRN Secretiobs  HYDROmorphone  Injectable 1 milliGRAM(s) IV Push every 2 hours PRN pain or SOB  LORazepam   Injectable 1 milliGRAM(s) IV Push every 4 hours PRN Signs of Anxiety      ASSESSMENT / PLAN:  ----------------------------------------  89M with a history of dementia, coronary artery disease with bypass surgery, chronic kidney disease, peptic ulcer disease, and hypothyroidism, who initially presented to [facility] with difficulty speaking and right sided weakness who was transferred to [facility] for further management.    Stroke with hemorrhagic conversion - Neurosurgery consultation noted without plans for intervention. Palliative Care consultation noted. Planned for inpatient hospice care.    Coronary artery disease - Comfort care measures in progress.    Chronic kidney disease - Initial laboratory studies were without hyperkalemia. No further blood draws as per family wishes.    Overall prognosis grave. DNR/DNI orders in place.

## 2021-07-05 NOTE — DISCHARGE NOTE PROVIDER - HOSPITAL COURSE
89M presented from the assisted living facility to [facility] with difficulty speaking, right sided weakness, aphasia, and leftward gaze. CT of the head noted 3.7 x 3.6 x 3.3 cm acute left parietotemporal parenchymal hemorrhage with surrounding edema, additional 1.2 x 1.6 cm focus of acute parenchymal hemorrhage in the left insular region, no hydrocephalus, midline shift, or effacement of basal cisterns, chronic right occipital infarct, moderate white matter microvascular ischemic disease. The patient was transferred to [facility] for further management. On presentation, the patient was noted to be obtunded. WBC(11.32), H/H(11.5/36.1), BUN/Cr(33.1/1.56), The patient was evaluated by Neurosurgery in consultation and thought to be a poor surgical candidate, no neurosurgical interventions were recommended. The patient’s daughter was contacted to discuss his condition and poor prognosis. It was noted that the patient had prior DNR/DNI orders and she did not wish to pursue aggressive measures. Comfort care measures were initiated. The patient was transferred to inpatient hospice for further care.        38 minutes total time

## 2021-07-05 NOTE — DISCHARGE NOTE NURSING/CASE MANAGEMENT/SOCIAL WORK - PATIENT PORTAL LINK FT
You can access the FollowMyHealth Patient Portal offered by [facility] by registering at the following website: [email or web address]. By joining [employer]’s FollowMyHealth portal, you will also be able to view your health information using other applications (apps) compatible with our system.

## 2021-07-05 NOTE — DISCHARGE NOTE NURSING/CASE MANAGEMENT/SOCIAL WORK - NSDCVIVACCINE_GEN_ALL_CORE_FT
Td (adult) preservative free; 03-Jul-2021 22:49; Leelee Connelly (RN); [employer]; [id number] (Exp. Date: 18-Nov-2021); IntraMuscular; Deltoid Right.; 0.5 milliLiter(s); VIS (VIS Published: 03-Jul-2021, VIS Presented: 03-Jul-2021);

## 2021-07-05 NOTE — PROGRESS NOTE ADULT - REASON FOR ADMISSION
Transfer from [facility] for CVA with hemorraghic conversion
Transfer from [facility] for CVA with hemorraghic conversion
Transfer from [facility] for CVA with hemorraghic conversion
Transfer from [facility] for CVA with hemorraghic conversion

## 2021-07-05 NOTE — PROGRESS NOTE ADULT - ASSESSMENT
89yo M with hemorrhagic CVA. Now on comfort measures    -------------------------  #1 CVA - poor prognosis. comfort measures only     # 2 ENCEPHALOPATHY/ AGITATION/ TERMINAL DELIRIUM  - Ativan 1mg IV q4h PRN, and add ATC dosing     #3 RESPIRATORY FAILURE/ DYSPNEA/ SHORTNESS OF BREATH  - Received multiple PRNs, will add ATC dosing and increase PRN to 1mg   - Can supplement O2 via NC for comfort or place a bedside fan to the face    #4 INCREASED OROPHARYNGEAL SECRETIONS/ CONGESTION OF UPPER AIRWAY/ TERMINAL SECRETIONS  - Glycopyrrolate 0.2mg IV/ SQ q4h PRN  - Can also consider a Scopolamine patch     #5 Encounter for palliative care - inpatient hospice transfer in [place]

## 2021-07-05 NOTE — DISCHARGE NOTE PROVIDER - NSDCCPCAREPLAN_GEN_ALL_CORE_FT
PRINCIPAL DISCHARGE DIAGNOSIS  Diagnosis: Brain bleed  Assessment and Plan of Treatment: Comfort care

## 2021-07-05 NOTE — DISCHARGE NOTE PROVIDER - NSDCMRMEDTOKEN_GEN_ALL_CORE_FT
glycopyrrolate 0.2 mg/mL injectable solution: 0.2 milligram(s) injectable every 4 hours, As Needed  HYDROmorphone: 0.5 milligram(s) intravenous every 4 hours, As Needed  LORazepam: 1 milligram(s) intravenous every 6 hours

## 2021-07-06 LAB — GLUCOSE BLDC GLUCOMTR-MCNC: 146 MG/DL — HIGH (ref 70–99)

## 2022-06-17 NOTE — PATIENT PROFILE ADULT. - BRADEN SCORE (IF 18 OR LESS ACTIVATE SKIN INJURY RISK INCREASED GUIDELINE), MLM
19 Peng Advancement Flap Text: The defect edges were debeveled with a #15 scalpel blade.  Given the location of the defect, shape of the defect and the proximity to free margins a Peng advancement flap was deemed most appropriate.  Using a sterile surgical marker, an appropriate advancement flap was drawn incorporating the defect and placing the expected incisions within the relaxed skin tension lines where possible. The area thus outlined was incised deep to adipose tissue with a #15 scalpel blade.  The skin margins were undermined to an appropriate distance in all directions utilizing iris scissors.

## 2024-02-19 RX ORDER — PREGABALIN 225 MG/1
1 CAPSULE ORAL
Qty: 0 | Refills: 0 | DISCHARGE

## 2024-02-19 RX ORDER — PANTOPRAZOLE SODIUM 20 MG/1
1 TABLET, DELAYED RELEASE ORAL
Qty: 0 | Refills: 0 | DISCHARGE

## 2024-02-19 RX ORDER — ERGOCALCIFEROL 1.25 MG/1
1 CAPSULE ORAL
Qty: 0 | Refills: 0 | DISCHARGE

## 2024-02-19 RX ORDER — DOCUSATE SODIUM 100 MG
0 CAPSULE ORAL
Qty: 0 | Refills: 0 | DISCHARGE

## 2024-02-19 RX ORDER — SOD,AMMONIUM,POTASSIUM LACTATE
1 CREAM (GRAM) TOPICAL
Qty: 0 | Refills: 0 | DISCHARGE

## 2024-02-19 RX ORDER — HYDRALAZINE HCL 50 MG
1 TABLET ORAL
Qty: 0 | Refills: 0 | DISCHARGE

## 2024-02-19 RX ORDER — ZINC SULFATE TAB 220 MG (50 MG ZINC EQUIVALENT) 220 (50 ZN) MG
1 TAB ORAL
Qty: 0 | Refills: 0 | DISCHARGE

## 2024-02-19 RX ORDER — ROPINIROLE 8 MG/1
1 TABLET, FILM COATED, EXTENDED RELEASE ORAL
Qty: 0 | Refills: 0 | DISCHARGE

## 2024-02-19 RX ORDER — ASPIRIN/CALCIUM CARB/MAGNESIUM 324 MG
1 TABLET ORAL
Qty: 0 | Refills: 0 | DISCHARGE

## 2024-02-19 RX ORDER — LEVOTHYROXINE SODIUM 125 MCG
1 TABLET ORAL
Qty: 0 | Refills: 0 | DISCHARGE

## 2024-02-19 RX ORDER — LANOLIN ALCOHOL/MO/W.PET/CERES
1 CREAM (GRAM) TOPICAL
Qty: 0 | Refills: 0 | DISCHARGE

## 2024-02-19 RX ORDER — SENNA PLUS 8.6 MG/1
2 TABLET ORAL
Qty: 0 | Refills: 0 | DISCHARGE

## 2024-02-19 RX ORDER — METOPROLOL TARTRATE 50 MG
0.5 TABLET ORAL
Qty: 0 | Refills: 0 | DISCHARGE

## 2024-02-19 RX ORDER — ASCORBIC ACID 60 MG
1 TABLET,CHEWABLE ORAL
Qty: 0 | Refills: 0 | DISCHARGE

## 2024-02-19 RX ORDER — SIMVASTATIN 20 MG/1
1 TABLET, FILM COATED ORAL
Qty: 0 | Refills: 0 | DISCHARGE